# Patient Record
Sex: FEMALE | Race: BLACK OR AFRICAN AMERICAN | Employment: OTHER | URBAN - METROPOLITAN AREA
[De-identification: names, ages, dates, MRNs, and addresses within clinical notes are randomized per-mention and may not be internally consistent; named-entity substitution may affect disease eponyms.]

---

## 2017-04-05 ENCOUNTER — HOSPITAL ENCOUNTER (OUTPATIENT)
Dept: LAB | Age: 77
Discharge: HOME | End: 2017-04-05

## 2017-04-05 PROCEDURE — 99001 SPECIMEN HANDLING PT-LAB: CPT | Performed by: INTERNAL MEDICINE

## 2017-09-20 ENCOUNTER — HOSPITAL ENCOUNTER (OUTPATIENT)
Dept: LAB | Age: 77
Discharge: HOME | End: 2017-09-20

## 2017-09-20 PROCEDURE — 99001 SPECIMEN HANDLING PT-LAB: CPT | Performed by: INTERNAL MEDICINE

## 2020-03-25 ENCOUNTER — HOSPITAL ENCOUNTER (INPATIENT)
Age: 80
LOS: 4 days | Discharge: NURSING, REHAB OR LONG-TERM CARE | DRG: 064 | End: 2020-03-29
Attending: EMERGENCY MEDICINE | Admitting: HOSPITALIST
Payer: MEDICARE

## 2020-03-25 ENCOUNTER — APPOINTMENT (OUTPATIENT)
Dept: VASCULAR SURGERY | Age: 80
DRG: 064 | End: 2020-03-25
Attending: HOSPITALIST
Payer: MEDICARE

## 2020-03-25 ENCOUNTER — APPOINTMENT (OUTPATIENT)
Dept: GENERAL RADIOLOGY | Age: 80
DRG: 064 | End: 2020-03-25
Attending: EMERGENCY MEDICINE
Payer: MEDICARE

## 2020-03-25 ENCOUNTER — APPOINTMENT (OUTPATIENT)
Dept: MRI IMAGING | Age: 80
DRG: 064 | End: 2020-03-25
Attending: HOSPITALIST
Payer: MEDICARE

## 2020-03-25 ENCOUNTER — APPOINTMENT (OUTPATIENT)
Dept: CT IMAGING | Age: 80
DRG: 064 | End: 2020-03-25
Attending: EMERGENCY MEDICINE
Payer: MEDICARE

## 2020-03-25 ENCOUNTER — APPOINTMENT (OUTPATIENT)
Dept: ULTRASOUND IMAGING | Age: 80
DRG: 064 | End: 2020-03-25
Attending: HOSPITALIST
Payer: MEDICARE

## 2020-03-25 ENCOUNTER — APPOINTMENT (OUTPATIENT)
Dept: NON INVASIVE DIAGNOSTICS | Age: 80
DRG: 064 | End: 2020-03-25
Attending: HOSPITALIST
Payer: MEDICARE

## 2020-03-25 DIAGNOSIS — R41.0 DISORIENTATION: ICD-10-CM

## 2020-03-25 DIAGNOSIS — E78.2 MIXED HYPERLIPIDEMIA: ICD-10-CM

## 2020-03-25 DIAGNOSIS — I63.311 CEREBROVASCULAR ACCIDENT (CVA) DUE TO THROMBOSIS OF RIGHT MIDDLE CEREBRAL ARTERY (HCC): ICD-10-CM

## 2020-03-25 DIAGNOSIS — R47.1 DYSARTHRIA: Primary | ICD-10-CM

## 2020-03-25 DIAGNOSIS — I10 ESSENTIAL HYPERTENSION: ICD-10-CM

## 2020-03-25 DIAGNOSIS — R29.810 WEAKNESS, FACIAL: ICD-10-CM

## 2020-03-25 PROBLEM — I65.23 BILATERAL CAROTID ARTERY STENOSIS: Status: ACTIVE | Noted: 2020-03-25

## 2020-03-25 PROBLEM — R47.01 APHASIA: Status: ACTIVE | Noted: 2020-03-25

## 2020-03-25 LAB
ALBUMIN SERPL-MCNC: 3.6 G/DL (ref 3.5–5)
ALBUMIN/GLOB SERPL: 0.9 {RATIO} (ref 1.1–2.2)
ALP SERPL-CCNC: 109 U/L (ref 45–117)
ALT SERPL-CCNC: 21 U/L (ref 12–78)
ANION GAP SERPL CALC-SCNC: 5 MMOL/L (ref 5–15)
AST SERPL-CCNC: 17 U/L (ref 15–37)
ATRIAL RATE: 84 BPM
AV PEAK GRADIENT: 39.19 MMHG
AV VELOCITY RATIO: 0.69
BASOPHILS # BLD: 0 K/UL (ref 0–0.1)
BASOPHILS NFR BLD: 0 % (ref 0–1)
BILIRUB SERPL-MCNC: 0.4 MG/DL (ref 0.2–1)
BUN SERPL-MCNC: 13 MG/DL (ref 6–20)
BUN/CREAT SERPL: 16 (ref 12–20)
CALCIUM SERPL-MCNC: 8.9 MG/DL (ref 8.5–10.1)
CALCULATED P AXIS, ECG09: 112 DEGREES
CALCULATED R AXIS, ECG10: -7 DEGREES
CALCULATED T AXIS, ECG11: 11 DEGREES
CHLORIDE SERPL-SCNC: 111 MMOL/L (ref 97–108)
CO2 SERPL-SCNC: 26 MMOL/L (ref 21–32)
COMMENT, HOLDF: NORMAL
CREAT SERPL-MCNC: 0.81 MG/DL (ref 0.55–1.02)
DIAGNOSIS, 93000: NORMAL
DIFFERENTIAL METHOD BLD: ABNORMAL
ECHO AO ROOT DIAM: 3.44 CM
ECHO AV AREA PEAK VELOCITY: 2 CM2
ECHO AV PEAK GRADIENT: 7.3 MMHG
ECHO AV PEAK VELOCITY: 135.16 CM/S
ECHO AV REGURGITANT PHT: 729.5 CM
ECHO EST RA PRESSURE: 10 MMHG
ECHO LA AREA 4C: 14.3 CM2
ECHO LA VOL 4C: 34.8 ML (ref 22–52)
ECHO LA VOLUME INDEX A4C: 20.2 ML/M2 (ref 16–28)
ECHO LV E' LATERAL VELOCITY: 5.89 CM/S
ECHO LV E' SEPTAL VELOCITY: 7.07 CM/S
ECHO LV EDV A4C: 67.8 ML
ECHO LV EDV INDEX A4C: 39.4 ML/M2
ECHO LV EJECTION FRACTION A4C: 49 %
ECHO LV ESV A4C: 34.8 ML
ECHO LV ESV INDEX A4C: 20.2 ML/M2
ECHO LV INTERNAL DIMENSION DIASTOLIC MMODE: 4.5 CM
ECHO LV INTERNAL DIMENSION SYSTOLIC MMODE: 2.8 CM
ECHO LV IVSD MMODE: 1.1 CM
ECHO LV IVSS MMODE: 2.02 CM
ECHO LV POSTERIOR WALL DIASTOLIC MMODE: 1.1 CM
ECHO LV POSTERIOR WALL SYSTOLIC MMODE: 1.7 CM
ECHO LVOT DIAM: 1.94 CM
ECHO LVOT PEAK GRADIENT: 3.5 MMHG
ECHO LVOT PEAK VELOCITY: 93.13 CM/S
ECHO MV A VELOCITY: 77.76 CM/S
ECHO MV E DECELERATION TIME (DT): 269 MS
ECHO MV E VELOCITY: 72.39 CM/S
ECHO MV E/A RATIO: 0.93
ECHO MV E/E' LATERAL: 12.29
ECHO MV E/E' RATIO (AVERAGED): 11.26
ECHO MV E/E' SEPTAL: 10.24
ECHO PULMONARY ARTERY SYSTOLIC PRESSURE (PASP): 40.5 MMHG
ECHO PV MAX VELOCITY: 68.08 CM/S
ECHO PV PEAK GRADIENT: 1.9 MMHG
ECHO RIGHT VENTRICULAR SYSTOLIC PRESSURE (RVSP): 40.5 MMHG
ECHO RV INTERNAL DIMENSION: 3.45 CM
ECHO TV A WAVE: 43.76 CM/S
ECHO TV E WAVE: 35.26 CM/S
ECHO TV EROA: 1.2
ECHO TV REGURGITANT MAX VELOCITY: 276.16 CM/S
ECHO TV REGURGITANT PEAK GRADIENT: 30.5 MMHG
EOSINOPHIL # BLD: 0 K/UL (ref 0–0.4)
EOSINOPHIL NFR BLD: 0 % (ref 0–7)
ERYTHROCYTE [DISTWIDTH] IN BLOOD BY AUTOMATED COUNT: 15.8 % (ref 11.5–14.5)
GLOBULIN SER CALC-MCNC: 4.1 G/DL (ref 2–4)
GLUCOSE BLD STRIP.AUTO-MCNC: 105 MG/DL (ref 65–100)
GLUCOSE BLD STRIP.AUTO-MCNC: 110 MG/DL (ref 65–100)
GLUCOSE BLD STRIP.AUTO-MCNC: 131 MG/DL (ref 65–100)
GLUCOSE BLD STRIP.AUTO-MCNC: 138 MG/DL (ref 65–100)
GLUCOSE SERPL-MCNC: 123 MG/DL (ref 65–100)
HCT VFR BLD AUTO: 43.2 % (ref 35–47)
HGB BLD-MCNC: 13.9 G/DL (ref 11.5–16)
IMM GRANULOCYTES # BLD AUTO: 0 K/UL (ref 0–0.04)
IMM GRANULOCYTES NFR BLD AUTO: 0 % (ref 0–0.5)
INR PPP: 1 (ref 0.9–1.1)
LEFT CCA DIST DIAS: 18.1 CM/S
LEFT CCA DIST SYS: 67.4 CM/S
LEFT CCA PROX DIAS: 16.3 CM/S
LEFT CCA PROX SYS: 67.4 CM/S
LEFT ECA DIAS: 19.9 CM/S
LEFT ECA SYS: 61.9 CM/S
LEFT ICA DIST DIAS: 10.8 CM/S
LEFT ICA DIST SYS: 43.7 CM/S
LEFT ICA MID DIAS: 10.8 CM/S
LEFT ICA MID SYS: 34.5 CM/S
LEFT ICA PROX DIAS: 12.6 CM/S
LEFT ICA PROX SYS: 54.6 CM/S
LEFT ICA/CCA SYS: 0.8
LEFT SUBCLAVIAN DIAS: 0 CM/S
LEFT SUBCLAVIAN SYS: 135 CM/S
LEFT VERTEBRAL DIAS: 10.8 CM/S
LEFT VERTEBRAL SYS: 52.8 CM/S
LVFS: 30.22 %
LYMPHOCYTES # BLD: 0.9 K/UL (ref 0.8–3.5)
LYMPHOCYTES NFR BLD: 14 % (ref 12–49)
MCH RBC QN AUTO: 30 PG (ref 26–34)
MCHC RBC AUTO-ENTMCNC: 32.2 G/DL (ref 30–36.5)
MCV RBC AUTO: 93.1 FL (ref 80–99)
MONOCYTES # BLD: 0.5 K/UL (ref 0–1)
MONOCYTES NFR BLD: 7 % (ref 5–13)
MV DEC SLOPE: 2.69
NEUTS SEG # BLD: 5.4 K/UL (ref 1.8–8)
NEUTS SEG NFR BLD: 79 % (ref 32–75)
NRBC # BLD: 0 K/UL (ref 0–0.01)
NRBC BLD-RTO: 0 PER 100 WBC
P-R INTERVAL, ECG05: 122 MS
PISA AR MAX VEL: 313.02 CM/S
PLATELET # BLD AUTO: 240 K/UL (ref 150–400)
PMV BLD AUTO: 10.5 FL (ref 8.9–12.9)
POTASSIUM SERPL-SCNC: 4.3 MMOL/L (ref 3.5–5.1)
PROT SERPL-MCNC: 7.7 G/DL (ref 6.4–8.2)
PROTHROMBIN TIME: 10.3 SEC (ref 9–11.1)
Q-T INTERVAL, ECG07: 400 MS
QRS DURATION, ECG06: 78 MS
QTC CALCULATION (BEZET), ECG08: 472 MS
RBC # BLD AUTO: 4.64 M/UL (ref 3.8–5.2)
RIGHT CCA DIST DIAS: 19.9 CM/S
RIGHT CCA DIST SYS: 63.7 CM/S
RIGHT CCA PROX DIAS: 19.9 CM/S
RIGHT CCA PROX SYS: 87.5 CM/S
RIGHT ECA DIAS: 23.6 CM/S
RIGHT ECA SYS: 91.1 CM/S
RIGHT ICA DIST DIAS: 16.1 CM/S
RIGHT ICA DIST SYS: 55.5 CM/S
RIGHT ICA MID DIAS: 19.9 CM/S
RIGHT ICA MID SYS: 72.9 CM/S
RIGHT ICA PROX DIAS: 16.3 CM/S
RIGHT ICA PROX SYS: 54.6 CM/S
RIGHT ICA/CCA SYS: 1.1
RIGHT SUBCLAVIAN DIAS: 19.9 CM/S
RIGHT SUBCLAVIAN SYS: 93 CM/S
RIGHT VERTEBRAL DIAS: 0 CM/S
RIGHT VERTEBRAL SYS: 32.8 CM/S
SAMPLES BEING HELD,HOLD: NORMAL
SERVICE CMNT-IMP: ABNORMAL
SODIUM SERPL-SCNC: 142 MMOL/L (ref 136–145)
TROPONIN I SERPL-MCNC: <0.05 NG/ML
VENTRICULAR RATE, ECG03: 84 BPM
WBC # BLD AUTO: 6.9 K/UL (ref 3.6–11)

## 2020-03-25 PROCEDURE — 97162 PT EVAL MOD COMPLEX 30 MIN: CPT | Performed by: PHYSICAL THERAPIST

## 2020-03-25 PROCEDURE — 70450 CT HEAD/BRAIN W/O DYE: CPT

## 2020-03-25 PROCEDURE — 36415 COLL VENOUS BLD VENIPUNCTURE: CPT

## 2020-03-25 PROCEDURE — 70498 CT ANGIOGRAPHY NECK: CPT

## 2020-03-25 PROCEDURE — 65660000000 HC RM CCU STEPDOWN

## 2020-03-25 PROCEDURE — 93880 EXTRACRANIAL BILAT STUDY: CPT

## 2020-03-25 PROCEDURE — 99285 EMERGENCY DEPT VISIT HI MDM: CPT

## 2020-03-25 PROCEDURE — 0042T CT CODE NEURO PERF W CBF: CPT

## 2020-03-25 PROCEDURE — 74011250637 HC RX REV CODE- 250/637: Performed by: HOSPITALIST

## 2020-03-25 PROCEDURE — 80053 COMPREHEN METABOLIC PANEL: CPT

## 2020-03-25 PROCEDURE — 93005 ELECTROCARDIOGRAM TRACING: CPT

## 2020-03-25 PROCEDURE — 97116 GAIT TRAINING THERAPY: CPT | Performed by: PHYSICAL THERAPIST

## 2020-03-25 PROCEDURE — 71045 X-RAY EXAM CHEST 1 VIEW: CPT

## 2020-03-25 PROCEDURE — 85610 PROTHROMBIN TIME: CPT

## 2020-03-25 PROCEDURE — 97535 SELF CARE MNGMENT TRAINING: CPT | Performed by: OCCUPATIONAL THERAPIST

## 2020-03-25 PROCEDURE — 70551 MRI BRAIN STEM W/O DYE: CPT

## 2020-03-25 PROCEDURE — 84484 ASSAY OF TROPONIN QUANT: CPT

## 2020-03-25 PROCEDURE — 85025 COMPLETE CBC W/AUTO DIFF WBC: CPT

## 2020-03-25 PROCEDURE — 92610 EVALUATE SWALLOWING FUNCTION: CPT

## 2020-03-25 PROCEDURE — 93306 TTE W/DOPPLER COMPLETE: CPT

## 2020-03-25 PROCEDURE — 97166 OT EVAL MOD COMPLEX 45 MIN: CPT | Performed by: OCCUPATIONAL THERAPIST

## 2020-03-25 PROCEDURE — 92523 SPEECH SOUND LANG COMPREHEN: CPT

## 2020-03-25 PROCEDURE — 74011250636 HC RX REV CODE- 250/636: Performed by: HOSPITALIST

## 2020-03-25 PROCEDURE — 74011636320 HC RX REV CODE- 636/320: Performed by: EMERGENCY MEDICINE

## 2020-03-25 PROCEDURE — 95816 EEG AWAKE AND DROWSY: CPT | Performed by: PSYCHIATRY & NEUROLOGY

## 2020-03-25 PROCEDURE — 76536 US EXAM OF HEAD AND NECK: CPT

## 2020-03-25 PROCEDURE — 82962 GLUCOSE BLOOD TEST: CPT

## 2020-03-25 RX ORDER — SODIUM CHLORIDE 0.9 % (FLUSH) 0.9 %
5-40 SYRINGE (ML) INJECTION EVERY 8 HOURS
Status: DISCONTINUED | OUTPATIENT
Start: 2020-03-25 | End: 2020-03-29 | Stop reason: HOSPADM

## 2020-03-25 RX ORDER — INSULIN LISPRO 100 [IU]/ML
INJECTION, SOLUTION INTRAVENOUS; SUBCUTANEOUS
Status: DISCONTINUED | OUTPATIENT
Start: 2020-03-25 | End: 2020-03-29 | Stop reason: HOSPADM

## 2020-03-25 RX ORDER — LOSARTAN POTASSIUM 50 MG/1
100 TABLET ORAL DAILY
Status: DISCONTINUED | OUTPATIENT
Start: 2020-03-25 | End: 2020-03-29 | Stop reason: HOSPADM

## 2020-03-25 RX ORDER — DEXTROSE 50 % IN WATER (D50W) INTRAVENOUS SYRINGE
12.5-25 AS NEEDED
Status: DISCONTINUED | OUTPATIENT
Start: 2020-03-25 | End: 2020-03-29 | Stop reason: HOSPADM

## 2020-03-25 RX ORDER — ASPIRIN 300 MG/1
300 SUPPOSITORY RECTAL
Status: COMPLETED | OUTPATIENT
Start: 2020-03-25 | End: 2020-03-25

## 2020-03-25 RX ORDER — SODIUM CHLORIDE 0.9 % (FLUSH) 0.9 %
5-40 SYRINGE (ML) INJECTION AS NEEDED
Status: DISCONTINUED | OUTPATIENT
Start: 2020-03-25 | End: 2020-03-29 | Stop reason: HOSPADM

## 2020-03-25 RX ORDER — ATORVASTATIN CALCIUM 40 MG/1
40 TABLET, FILM COATED ORAL
Status: DISCONTINUED | OUTPATIENT
Start: 2020-03-25 | End: 2020-03-29 | Stop reason: HOSPADM

## 2020-03-25 RX ORDER — MAGNESIUM SULFATE 100 %
4 CRYSTALS MISCELLANEOUS AS NEEDED
Status: DISCONTINUED | OUTPATIENT
Start: 2020-03-25 | End: 2020-03-29 | Stop reason: HOSPADM

## 2020-03-25 RX ORDER — DILTIAZEM HYDROCHLORIDE 120 MG/1
240 CAPSULE, COATED, EXTENDED RELEASE ORAL DAILY
Status: DISCONTINUED | OUTPATIENT
Start: 2020-03-25 | End: 2020-03-27

## 2020-03-25 RX ORDER — METOPROLOL TARTRATE 50 MG/1
50 TABLET ORAL 2 TIMES DAILY
Status: DISCONTINUED | OUTPATIENT
Start: 2020-03-25 | End: 2020-03-29 | Stop reason: HOSPADM

## 2020-03-25 RX ORDER — GUAIFENESIN 100 MG/5ML
81 LIQUID (ML) ORAL DAILY
Status: CANCELLED | OUTPATIENT
Start: 2020-03-26

## 2020-03-25 RX ORDER — ACETAMINOPHEN 325 MG/1
650 TABLET ORAL
Status: DISCONTINUED | OUTPATIENT
Start: 2020-03-25 | End: 2020-03-29 | Stop reason: HOSPADM

## 2020-03-25 RX ORDER — ASPIRIN 300 MG/1
300 SUPPOSITORY RECTAL DAILY
Status: DISCONTINUED | OUTPATIENT
Start: 2020-03-26 | End: 2020-03-26

## 2020-03-25 RX ORDER — ACETAMINOPHEN 650 MG/1
650 SUPPOSITORY RECTAL
Status: DISCONTINUED | OUTPATIENT
Start: 2020-03-25 | End: 2020-03-29 | Stop reason: HOSPADM

## 2020-03-25 RX ORDER — ONDANSETRON 2 MG/ML
4 INJECTION INTRAMUSCULAR; INTRAVENOUS
Status: DISCONTINUED | OUTPATIENT
Start: 2020-03-25 | End: 2020-03-29 | Stop reason: HOSPADM

## 2020-03-25 RX ORDER — SODIUM CHLORIDE 0.9 % (FLUSH) 0.9 %
10 SYRINGE (ML) INJECTION
Status: COMPLETED | OUTPATIENT
Start: 2020-03-25 | End: 2020-03-25

## 2020-03-25 RX ORDER — ENOXAPARIN SODIUM 100 MG/ML
40 INJECTION SUBCUTANEOUS EVERY 24 HOURS
Status: DISCONTINUED | OUTPATIENT
Start: 2020-03-25 | End: 2020-03-29 | Stop reason: HOSPADM

## 2020-03-25 RX ORDER — ASPIRIN 325 MG
325 TABLET ORAL ONCE
Status: DISCONTINUED | OUTPATIENT
Start: 2020-03-25 | End: 2020-03-25

## 2020-03-25 RX ADMIN — Medication 10 ML: at 07:12

## 2020-03-25 RX ADMIN — Medication 10 ML: at 08:56

## 2020-03-25 RX ADMIN — IOPAMIDOL 120 ML: 755 INJECTION, SOLUTION INTRAVENOUS at 07:12

## 2020-03-25 RX ADMIN — ASPIRIN 300 MG: 300 SUPPOSITORY RECTAL at 08:55

## 2020-03-25 RX ADMIN — METOPROLOL TARTRATE 50 MG: 50 TABLET, FILM COATED ORAL at 17:57

## 2020-03-25 RX ADMIN — Medication 10 ML: at 21:14

## 2020-03-25 RX ADMIN — Medication 10 ML: at 13:25

## 2020-03-25 RX ADMIN — ATORVASTATIN CALCIUM 40 MG: 40 TABLET, FILM COATED ORAL at 21:14

## 2020-03-25 RX ADMIN — ENOXAPARIN SODIUM 40 MG: 40 INJECTION SUBCUTANEOUS at 17:56

## 2020-03-25 NOTE — PROGRESS NOTES
Reason for Admission: Aphasia                      RUR Score: 9% - low risk                    Plan for utilizing home health: PT/OT/SLP consulted. CM will continue to follow to determine if pt has any post-acute therapy needs at d/c         PCP: First and Last name:  Pt's daughter (Madyson Vargas: [phone]) reported PCP listed, Dr. Nataly Cho is no longer pt's doctor. Pt's daughter reported pt has a new PCP but is not able to recall name. Pt's daughter reported she is going to look in her files to identify current PCP; pt's daughter will contact CM once she is able to secure updated information. Name of Practice: Pt's daughter unable to recall name of Dr. Javier Coon practice   Are you a current patient: Yes/No: No   Approximate date of last visit: Pt's daughter unable to report pt's last visit with Dr. Sebastien Travis. Pt's daughter reported pt has a new PCP provider who she last saw 3/23/2020. Current Advanced Directive/Advance Care Plan: Pt is listed as a full code status with no ACP on file. CM inquired if pt's daughter is aware of whether or not pt has had ACP completed; pt's daughter confirmed reporting \"we already did that with a . \"                          Transition of Care Plan:                      * TBD - anticipate home with HH vs SNF    > Pt will need PCP f/u appt secured prior to d/c  > 2nd IM & FOC prior to d/c    CM contacted pt's daughter to check in, introduce role, and complete initial assessment. Pt's daughter verified demographic information and confirmed she lives with pt and remains accessible if needs arise. Pt's daughter reported pt is independent with ADL's at baseline, uses no DME in the home, and has no barriers related to accessing/paying for medication. Pt's preferred pharmacy is the [facility] on [address]. Pt's daughter reported pt has no prior hx of utilizing HH, SNF, or in-pt rehab intervention.     CM will continue to follow patient for discharge planning needs and arrange for services as deemed necessary. Care Management Interventions  PCP Verified by CM: No  Mode of Transport at Discharge:  Other (see comment)(Pt's daughter will provide transportation at d/c)  Transition of Care Consult (CM Consult): Discharge Planning  Discharge Durable Medical Equipment: No  Physical Therapy Consult: Yes  Occupational Therapy Consult: Yes  Speech Therapy Consult: Yes  Current Support Network: Own Home, Other(Pt lives with her daughter in single story home with 3 stairs leading to the entrance)  Confirm Follow Up Transport: Family  Discharge Location  Discharge Placement: Home with home health    Nalini Horowitz, [address] [place] [address], [address] [facility]

## 2020-03-25 NOTE — PROGRESS NOTES
TRANSFER - IN REPORT:    Verbal report received from Osito Bright (name) on [facility]  being received from ED (unit) for routine progression of care      Report consisted of patients Situation, Background, Assessment and   Recommendations(SBAR). Information from the following report(s) SBAR, Kardex, ED Summary, Intake/Output, MAR and Recent Results was reviewed with the receiving nurse. Opportunity for questions and clarification was provided.

## 2020-03-25 NOTE — CONSULTS
IP CONSULT TO NEUROLOGY  Consult performed by: Jackie Fonseca MD  Consult ordered by: Abrahan Granger MD            NEUROLOGY CONSULT    NAME Shraddha Walker AGE 70 y.o. MRN [id number]  1940     REQUESTING PHYSICIAN: Abrahan Granger MD      CHIEF COMPLAINT: Left-sided weakness     This is a 70 y.o. female with medical history of stroke, hypertension and diabetes. The patient is admitted for slurred speech and left facial droop. Symptoms started the night of the . Symptoms have persisted. ASSESSMENT AND PLAN     1. Stroke  MRI shows extensive white matter disease and small acute infarction of the posterior right corona radiata  CT of the head and neck reveals no flow-limiting stenosis or intracranial aneurysm  Of mention her thyroid hypodensities with recommendation for non-urgent outpatient thyroid ultrasound  Echocardiogram shows left EF of 55 to 60%  LDL hemoglobin A1c are pending  Permissive hypertension for the first 24 hours  Start Plavix 75 mg if she passes a swallow eval  Continue Lipitor  Discussed risk factors for stroke and treatment    2. Severe dysarthria  Speech therapy    3. Hypertension  Continue Cozaar    4. Hyperlipidemia  Continue atorvastatin    5. Altered mental status  Obtain EEG      ALLERGIES:  Patient has no known allergies.      Current Facility-Administered Medications   Medication Dose Route Frequency    acetaminophen (TYLENOL) tablet 650 mg  650 mg Oral Q6H PRN    ondansetron (ZOFRAN) injection 4 mg  4 mg IntraVENous Q4H PRN    sodium chloride (NS) flush 5-40 mL  5-40 mL IntraVENous Q8H    sodium chloride (NS) flush 5-40 mL  5-40 mL IntraVENous PRN    enoxaparin (LOVENOX) injection 40 mg  40 mg SubCUTAneous Q24H    metoprolol tartrate (LOPRESSOR) tablet 50 mg  50 mg Oral BID    losartan (COZAAR) tablet 100 mg  100 mg Oral DAILY    atorvastatin (LIPITOR) tablet 40 mg  40 mg Oral QHS    dilTIAZem CD (CARDIZEM CD) capsule 240 mg  240 mg Oral DAILY    insulin lispro (HUMALOG) injection   SubCUTAneous TIDAC    glucose chewable tablet 16 g  4 Tab Oral PRN    dextrose (D50W) injection syrg 12.5-25 g  12.5-25 g IntraVENous PRN    glucagon (GLUCAGEN) injection 1 mg  1 mg IntraMUSCular PRN    [START ON 3/26/2020] aspirin (ASA) suppository 300 mg  300 mg Rectal DAILY    influenza vaccine 2019-20 (6 mos+)(PF) (FLUARIX/FLULAVAL/FLUZONE QUAD) injection 0.5 mL  0.5 mL IntraMUSCular PRIOR TO DISCHARGE    acetaminophen (TYLENOL) tablet 650 mg  650 mg Oral Q4H PRN    Or    acetaminophen (TYLENOL) solution 650 mg  650 mg Per NG tube Q4H PRN    Or    acetaminophen (TYLENOL) suppository 650 mg  650 mg Rectal Q4H PRN       Past Medical History:   Diagnosis Date    Diabetes ([facility] [address]     Hypertension        Social History     Tobacco Use    Smoking status: Passive Smoke Exposure - Never Smoker   Substance Use Topics    Alcohol use: No     Family history of  Sarcoidosis - niece  Dementia sister    Visit Vitals  /86 (BP 1 Location: Right arm, BP Patient Position: Sitting)   Pulse 94   Temp 98.3 °F (36.8 °C)   Resp 20   Ht 5' 2\" (1.575 m)   Wt 156 lb 8.4 oz (71 kg)   SpO2 99%   Breastfeeding No   BMI 28.63 kg/m²      General: Well developed, well nourished. Patient in no apparent distress   Head: Normocephalic, atraumatic, anicteric sclera   Neck Normal ROM, No thyromegally   Lungs:  Clear to auscultation bilaterally, No wheezes or rubs   Cardiac: Regular rate and rhythm with no murmurs. Abd: Bowel sounds were audible. No tenderness on palpation   Ext: No pedal edema   Skin: Supple no rash     NeurologicExam:  Mental Status:  Oriented to person place but not time appears confused, answers inconsistently   Speech:  Severe dysarthria   Cranial Nerves:  II - XII Intact subtle left facial droop   Motor:   Subtle left upper and lower extremity weakness   Reflexes:   Deep tendon reflexes 1+/4 and symmetric.    Sensory:    Sensory loss on the left   Gait:  Gait is balanced and fluid with normal arm swing. Tremor:   No tremor noted. Cerebellar:  Coordination intact. Neurovascular: No carotid bruits. No JVD           REVIEWED IMAGING:    MRI :  Results from Hospital Encounter encounter on 03/25/20   MRI BRAIN WO CONT    Narrative INDICATION:   stroke    EXAMINATION:  MRI BRAIN WO CONTRAST    COMPARISON:  October 4, 2009 MRI and CTA March 25, 2020    TECHNIQUE:  Multiplanar multisequence acquisition without contrast of the brain. FINDINGS:      Ventricles:  Midline, no hydrocephalus. Brain Parenchyma/Brainstem:  Extensive chronic white matter disease throughout  the supratentorial brain and to a lesser degree in the cesilia. Several areas of  chronic infarction involving the bilateral corona radiata, corpus callosum,  right thalamus. Small area of acute infarction posterior right corona radiata. Intracranial Hemorrhage:  None. Basal Cisterns:  Normal.   Flow Voids:  Normal.  Additional Comments:  N/A. Impression IMPRESSION:  Small acute infarction posterior right corona radiata. Areas of chronic  infarction and confluent white matter disease as above. CT:  Results from Hospital Encounter encounter on 03/25/20   CT CODE NEURO PERF W CBF    Narrative Clinical history: CODE S  INDICATION:   CODE S slurred speech, left-sided facial droop    COMPARISON:  3/25/2020  TECHNIQUE:    CT dose reduction was achieved through use of a standardized protocol tailored  for this examination and automatic exposure control for dose modulation. Following the uneventful administration of Isovue-370 contrast material, axial  CT angiography of the head and neck was performed. Coronal and sagittal  reconstructions were obtained. 3D MAXIMAL INTENSITY PROJECTION reconstructed imaging of the cranial vasculature  in both the coronal and sagittal plane was performed.    . Reconstructions were created with color coding of axial time to peak, axial  blood volume, axial blood flow, axial mean transit time and axial T Max. FINDINGS:  There is centrilobular emphysematous change. Thyroid nodules on the right and on  the left. Aortic atherosclerotic change is mild. The left vertebral artery is  dominant with a hypoplastic right vertebral artery. Multilevel canal and  foraminal stenosis of the cervical spine. Periventricular and extensive scattered hypodensities in the cerebral white  matter consistent with severe chronic microvascular ischemic change. .  Basilar  ganglia calcifications. . . The basal cisterns are clear. .    CTA NECK:   . The origins of the innominate, left common carotid and left subclavian  arteries are normal.    There is  0%stenosis in the right internal carotid artery utilizing NASCET  criteria. There is  0%stenosis in the right internal carotid artery utilizing NASCET  criteria. CTA HEAD:  Mild atherosclerotic change of the cavernous carotid arteries. The left  vertebral artery is dominant. Posterior communicating artery on the right. There  are A1 segments bilaterally. M1 segments are patent on the right and on the  left. M2 segments are patent. P1 segments are patent. Proximal P2 segments are  patent as well. . Moderate to severe distal P2 segment stenosis on the left. Mild  A2 segment stenosis distally on the right. . The internal carotid, anterior  cerebral, and middle cerebral arteries are patent. Hypoplastic right vertebral  artery. . There is no aneurysm. .    CT PERFUSION: No evidence of perfusion mismatch. No evidence of reversible  ischemia. Impression IMPRESSION:  No flow limiting stenosis or intracranial aneurysm. No major vessel occlusion. Severe chronic microvascular ischemic change with small/moderate remote  infarction in the left frontal lobe. Thyroid hypodensities. Nonemergent outpatient thyroid ultrasound when clinically  feasible is suggested.          REVIEWED LABS:  Lab Results   Component Value Date/Time    WBC 6.9 03/25/2020 07:20 AM    HCT 43.2 03/25/2020 07:20 AM    HGB 13.9 03/25/2020 07:20 AM    PLATELET 333 46/13/4187 07:20 AM     Lab Results   Component Value Date/Time    Sodium 142 03/25/2020 07:20 AM    Potassium 4.3 03/25/2020 07:20 AM    Chloride 111 (H) 03/25/2020 07:20 AM    CO2 26 03/25/2020 07:20 AM    Glucose 123 (H) 03/25/2020 07:20 AM    BUN 13 03/25/2020 07:20 AM    Creatinine 0.81 03/25/2020 07:20 AM    Calcium 8.9 03/25/2020 07:20 AM       Lab Results   Component Value Date/Time    LDL, calculated 77.4 10/04/2009 02:15 AM     Lab Results   Component Value Date/Time    Hemoglobin A1c 5.4 10/03/2009 06:11 PM

## 2020-03-25 NOTE — PROGRESS NOTES
HP dictated    Admitted for L sided facial droop/ slurred speech    Speech/ Neurology consulted. MRI ordered. Rectal ASA ordered start Po meds when cleared by Speech.

## 2020-03-25 NOTE — ED PROVIDER NOTES
EMERGENCY DEPARTMENT HISTORY AND PHYSICAL EXAM      Date: 3/25/2020  Patient Name: Nora Arthur    History of Presenting Illness     Chief Complaint   Patient presents with    Dysarthria       History Provided By: Patient and EMS    HPI: Nora Arthur, [address] [facility] y.o. female  presents to the ED with cc of slurred speech and left facial droop. Patient last known well was 8 PM last night when she went to bed. She woke up this morning with symptoms noted above. EMS was notified and transported the patient to the emergency department. They did not notice any weakness in the extremities such as arm drift. The patient denies any numbness anywhere. She has a history of hypertension and diabetes. Blood sugar by EMS was 118. She is not on any antiplatelets or anticoagulants. There are no other complaints, changes, or physical findings at this time. PCP: Grisel Montenegro MD    No current facility-administered medications on file prior to encounter. Current Outpatient Medications on File Prior to Encounter   Medication Sig Dispense Refill    pravastatin (PRAVACHOL) 20 mg tablet Take 20 mg by mouth nightly.  diltiazem CD (CARTIA XT) 240 mg ER capsule Take 240 mg by mouth daily.  metFORMIN (GLUCOPHAGE) 500 mg tablet Take 500 mg by mouth two (2) times daily (with meals).  losartan (COZAAR) 100 mg tablet Take 100 mg by mouth daily.  metoprolol tartrate (LOPRESSOR) 50 mg tablet Take 50 mg by mouth two (2) times a day.  ergocalciferol (VITAMIN D2) 50,000 unit capsule Take 50,000 Units by mouth every month.  diazepam (VALIUM) 2 mg tablet Take 1 Tab by mouth every eight (8) hours as needed (muscle spasm). Max Daily Amount: 6 mg. 20 Tab 0       Past History     Past Medical History:  Past Medical History:   Diagnosis Date    Diabetes ([facility] [address]     Hypertension        Past Surgical History:  No past surgical history on file. Family History:  No family history on file.     Social History:  Social History     Tobacco Use    Smoking status: Passive Smoke Exposure - Never Smoker   Substance Use Topics    Alcohol use: No    Drug use: No       Allergies:  No Known Allergies      Review of Systems   Review of Systems   Constitutional: Negative for chills and fever. HENT: Negative for congestion, ear pain, rhinorrhea, sore throat and trouble swallowing. Eyes: Negative for visual disturbance. Respiratory: Negative for cough, chest tightness and shortness of breath. Cardiovascular: Negative for chest pain and palpitations. Gastrointestinal: Negative for abdominal pain, blood in stool, constipation, diarrhea, nausea and vomiting. Genitourinary: Negative for decreased urine volume, difficulty urinating, dysuria and frequency. Musculoskeletal: Negative for back pain and neck pain. Skin: Negative for color change and rash. Neurological: Positive for facial asymmetry and speech difficulty. Negative for dizziness, weakness, light-headedness, numbness and headaches. Physical Exam   Physical Exam  Vitals signs and nursing note reviewed. Constitutional:       General: She is not in acute distress. Appearance: She is well-developed. She is not ill-appearing. Eyes:      Conjunctiva/sclera: Conjunctivae normal.   Neck:      Musculoskeletal: Neck supple. Cardiovascular:      Rate and Rhythm: Normal rate and regular rhythm. Pulmonary:      Effort: Pulmonary effort is normal. No accessory muscle usage or respiratory distress. Breath sounds: Normal breath sounds. Abdominal:      General: There is no distension. Palpations: Abdomen is soft. Tenderness: There is no abdominal tenderness. Lymphadenopathy:      Cervical: No cervical adenopathy. Skin:     General: Skin is warm and dry. Neurological:      Mental Status: She is alert and oriented to person, place, and time. Cranial Nerves: Dysarthria and facial asymmetry (mild left facial droop) present. No cranial nerve deficit. Sensory: No sensory deficit. Diagnostic Study Results     Labs -     Recent Results (from the past 24 hour(s))   CBC WITH AUTOMATED DIFF    Collection Time: 03/25/20  7:20 AM   Result Value Ref Range    WBC 6.9 3.6 - 11.0 K/uL    RBC 4.64 3.80 - 5.20 M/uL    HGB 13.9 11.5 - 16.0 g/dL    HCT 43.2 35.0 - 47.0 %    MCV 93.1 80.0 - 99.0 FL    MCH 30.0 26.0 - 34.0 PG    MCHC 32.2 30.0 - 36.5 g/dL    RDW 15.8 (H) 11.5 - 14.5 %    PLATELET 617 183 - 843 K/uL    MPV 10.5 8.9 - 12.9 FL    NRBC 0.0 0  WBC    ABSOLUTE NRBC 0.00 0.00 - 0.01 K/uL    NEUTROPHILS 79 (H) 32 - 75 %    LYMPHOCYTES 14 12 - 49 %    MONOCYTES 7 5 - 13 %    EOSINOPHILS 0 0 - 7 %    BASOPHILS 0 0 - 1 %    IMMATURE GRANULOCYTES 0 0.0 - 0.5 %    ABS. NEUTROPHILS 5.4 1.8 - 8.0 K/UL    ABS. LYMPHOCYTES 0.9 0.8 - 3.5 K/UL    ABS. MONOCYTES 0.5 0.0 - 1.0 K/UL    ABS. EOSINOPHILS 0.0 0.0 - 0.4 K/UL    ABS. BASOPHILS 0.0 0.0 - 0.1 K/UL    ABS. IMM. GRANS. 0.0 0.00 - 0.04 K/UL    DF AUTOMATED     METABOLIC PANEL, COMPREHENSIVE    Collection Time: 03/25/20  7:20 AM   Result Value Ref Range    Sodium 142 136 - 145 mmol/L    Potassium 4.3 3.5 - 5.1 mmol/L    Chloride 111 (H) 97 - 108 mmol/L    CO2 26 21 - 32 mmol/L    Anion gap 5 5 - 15 mmol/L    Glucose 123 (H) 65 - 100 mg/dL    BUN 13 6 - 20 MG/DL    Creatinine 0.81 0.55 - 1.02 MG/DL    BUN/Creatinine ratio 16 12 - 20      GFR est AA >60 >60 ml/min/1.73m2    GFR est non-AA >60 >60 ml/min/1.73m2    Calcium 8.9 8.5 - 10.1 MG/DL    Bilirubin, total 0.4 0.2 - 1.0 MG/DL    ALT (SGPT) 21 12 - 78 U/L    AST (SGOT) 17 15 - 37 U/L    Alk.  phosphatase 109 45 - 117 U/L    Protein, total 7.7 6.4 - 8.2 g/dL    Albumin 3.6 3.5 - 5.0 g/dL    Globulin 4.1 (H) 2.0 - 4.0 g/dL    A-G Ratio 0.9 (L) 1.1 - 2.2     PROTHROMBIN TIME + INR    Collection Time: 03/25/20  7:20 AM   Result Value Ref Range    INR 1.0 0.9 - 1.1      Prothrombin time 10.3 9.0 - 11.1 sec   TROPONIN I    Collection Time: 03/25/20  7:20 AM   Result Value Ref Range    Troponin-I, Qt. <0.05 <0.05 ng/mL   SAMPLES BEING HELD    Collection Time: 03/25/20  7:20 AM   Result Value Ref Range    SAMPLES BEING HELD  1 RED     COMMENT        Add-on orders for these samples will be processed based on acceptable specimen integrity and analyte stability, which may vary by analyte. GLUCOSE, POC    Collection Time: 03/25/20  7:33 AM   Result Value Ref Range    Glucose (POC) 110 (H) 65 - 100 mg/dL    Performed by Salena Carrel RN        Radiologic Studies -   CTA CODE NEURO HEAD AND NECK W CONT   Final Result   IMPRESSION:   No flow limiting stenosis or intracranial aneurysm. No major vessel occlusion. Severe chronic microvascular ischemic change with small/moderate remote   infarction in the left frontal lobe. Thyroid hypodensities. Nonemergent outpatient thyroid ultrasound when clinically   feasible is suggested. CT CODE NEURO PERF W CBF   Final Result   IMPRESSION:   No flow limiting stenosis or intracranial aneurysm. No major vessel occlusion. Severe chronic microvascular ischemic change with small/moderate remote   infarction in the left frontal lobe. Thyroid hypodensities. Nonemergent outpatient thyroid ultrasound when clinically   feasible is suggested. CT CODE NEURO HEAD WO CONTRAST   Final Result   IMPRESSION:    No definite acute process identified. Subtle asymmetry of density in the left M2 segment. CTA is pending. Imaging findings consistent with severe chronic microvascular ischemic change. There is a mild degree of cerebral atrophy. XR CHEST PORT    (Results Pending)     CT Results  (Last 48 hours)               03/25/20 0728  CTA CODE NEURO HEAD AND NECK W CONT Final result    Impression:  IMPRESSION:   No flow limiting stenosis or intracranial aneurysm. No major vessel occlusion.    Severe chronic microvascular ischemic change with small/moderate remote   infarction in the left frontal lobe. Thyroid hypodensities. Nonemergent outpatient thyroid ultrasound when clinically   feasible is suggested. Narrative:  Clinical history: Code Stroke   INDICATION:   Code Stroke       COMPARISON:  3/25/2020   TECHNIQUE:     CT dose reduction was achieved through use of a standardized protocol tailored   for this examination and automatic exposure control for dose modulation. Following the uneventful administration of Isovue-370 contrast material, axial   CT angiography of the head and neck was performed. Coronal and sagittal   reconstructions were obtained. 3D MAXIMAL INTENSITY PROJECTION reconstructed imaging of the cranial vasculature   in both the coronal and sagittal plane was performed. . Reconstructions were created with color coding of axial time to peak, axial   blood volume, axial blood flow, axial mean transit time and axial T Max. FINDINGS:   There is centrilobular emphysematous change. Thyroid nodules on the right and on   the left. Aortic atherosclerotic change is mild. The left vertebral artery is   dominant with a hypoplastic right vertebral artery. Multilevel canal and   foraminal stenosis of the cervical spine. Periventricular and extensive scattered hypodensities in the cerebral white   matter consistent with severe chronic microvascular ischemic change. .  Basilar   ganglia calcifications. . The gray white differentiation is maintained. The   basal cisterns are clear. The paranasal sinuses are clear. CTA NECK:    There is conventional three vessel arch anatomy. The origins of the innominate,   left common carotid and left subclavian arteries are normal.     There is  0%stenosis in the right internal carotid artery utilizing NASCET   criteria. There is  0%stenosis in the right internal carotid artery utilizing NASCET   criteria. CTA HEAD:   Mild atherosclerotic change of the cavernous carotid arteries.  The left   vertebral artery is dominant. Posterior communicating artery on the right. There   are A1 segments bilaterally. M1 segments are patent on the right and on the   left. M2 segments are patent. P1 segments are patent. Proximal P2 segments are   patent as well. . Moderate to severe distal P2 segment stenosis on the left. Mild   A2 segment stenosis distally on the right. . The internal carotid, anterior   cerebral, and middle cerebral arteries are patent. Hypoplastic right vertebral   artery. . There is no aneurysm. There are no sizable posterior communicating   arteries. CT PERFUSION: No evidence of perfusion mismatch. No evidence of reversible   ischemia.           03/25/20 0728  CT CODE NEURO PERF W CBF Final result    Impression:  IMPRESSION:   No flow limiting stenosis or intracranial aneurysm. No major vessel occlusion. Severe chronic microvascular ischemic change with small/moderate remote   infarction in the left frontal lobe. Thyroid hypodensities. Nonemergent outpatient thyroid ultrasound when clinically   feasible is suggested. Narrative:  Clinical history: CODE S   INDICATION:   CODE S slurred speech, left-sided facial droop       COMPARISON:  3/25/2020   TECHNIQUE:     CT dose reduction was achieved through use of a standardized protocol tailored   for this examination and automatic exposure control for dose modulation. Following the uneventful administration of Isovue-370 contrast material, axial   CT angiography of the head and neck was performed. Coronal and sagittal   reconstructions were obtained. 3D MAXIMAL INTENSITY PROJECTION reconstructed imaging of the cranial vasculature   in both the coronal and sagittal plane was performed. . Reconstructions were created with color coding of axial time to peak, axial   blood volume, axial blood flow, axial mean transit time and axial T Max. FINDINGS:   There is centrilobular emphysematous change. Thyroid nodules on the right and on   the left. Aortic atherosclerotic change is mild. The left vertebral artery is   dominant with a hypoplastic right vertebral artery. Multilevel canal and   foraminal stenosis of the cervical spine. Periventricular and extensive scattered hypodensities in the cerebral white   matter consistent with severe chronic microvascular ischemic change. .  Basilar   ganglia calcifications. . . The basal cisterns are clear. .       CTA NECK:    . The origins of the innominate, left common carotid and left subclavian   arteries are normal.     There is  0%stenosis in the right internal carotid artery utilizing NASCET   criteria. There is  0%stenosis in the right internal carotid artery utilizing NASCET   criteria. CTA HEAD:   Mild atherosclerotic change of the cavernous carotid arteries. The left   vertebral artery is dominant. Posterior communicating artery on the right. There   are A1 segments bilaterally. M1 segments are patent on the right and on the   left. M2 segments are patent. P1 segments are patent. Proximal P2 segments are   patent as well. . Moderate to severe distal P2 segment stenosis on the left. Mild   A2 segment stenosis distally on the right. . The internal carotid, anterior   cerebral, and middle cerebral arteries are patent. Hypoplastic right vertebral   artery. . There is no aneurysm. .       CT PERFUSION: No evidence of perfusion mismatch. No evidence of reversible   ischemia.           03/25/20 0716  CT CODE NEURO HEAD WO CONTRAST Final result    Impression:  IMPRESSION:    No definite acute process identified. Subtle asymmetry of density in the left M2 segment. CTA is pending. Imaging findings consistent with severe chronic microvascular ischemic change. There is a mild degree of cerebral atrophy.                Narrative:  CLINICAL HISTORY: Altered mental status, dizziness       INDICATION: Altered mental status, dizziness       COMPARISON: 10/13/2016   CT dose reduction was achieved through use of a standardized protocol tailored   for this examination and automatic exposure control for dose modulation. TECHNIQUE: Serial axial images with a collimation of 5 mm were obtained from the   skull base through the vertex     FINDINGS:    There is sulcal and ventricular prominence. Confluent periventricular and   scattered foci of hypodensity in the cerebral white matter. There is no evidence   of an acute infarction, hemorrhage, or mass-effect. There is no evidence of   midline shift or hydrocephalus. Posterior fossa structures are unremarkable. No   extra-axial collections are seen. Subtle asymmetric density left M2 posterior   division. Mastoid air cells are well pneumatized and clear. Infarction in the left frontal lobe. CXR Results  (Last 48 hours)    None            Medical Decision Making   I am the first provider for this patient. I reviewed the vital signs, available nursing notes, past medical history, past surgical history, family history and social history. Vital Signs-Reviewed the patient's vital signs. Patient Vitals for the past 24 hrs:   Temp Pulse Resp BP SpO2   03/25/20 0800 98.8 °F (37.1 °C) 85 18 134/42 100 %         Records Reviewed: Nursing Notes, Old Medical Records and Ambulance Run Sheet    Provider Notes (Medical Decision Making):   Patient presents to us with left facial droop and slurred speech. Patient was last known to be normal outside of the TPA window. She has no extremity weakness. CT and CTA imaging does not show any evidence of large vessel occlusion or cerebral hemorrhage. No metabolic abnormalities appear to be present. She does have a history of hypertension and diabetes which would place her at a high risk for stroke. No contraindications to antiplatelets. Will administer aspirin and recommend admission to the hospital for further evaluation. ED Course:   Initial assessment performed.  The patients presenting problems have been discussed, and they are in agreement with the care plan formulated and outlined with them. I have encouraged them to ask questions as they arise throughout their visit. Orders Placed This Encounter    CT CODE NEURO HEAD WO CONTRAST    CTA CODE NEURO HEAD AND NECK W CONT    CT CODE NEURO PERF W CBF    XR CHEST PORT    CBC WITH AUTOMATED DIFF    METABOLIC PANEL, COMPREHENSIVE    PROTHROMBIN TIME + INR    TROPONIN I    SAMPLES BEING HELD    DIET DIABETIC CONSISTENT CARB Regular    CARDIAC MONITOR - ED ONLY    PULSE OXIMETRY CONTINUOUS    VITAL SIGNS    INITIATE NURSING DYSPHAGIA SCREENING    MEASURE HEIGHT    WEIGH PATIENT    NEUROLOGIC STATUS ASSESSMENT    NIH STROKE SCALE ASSESSMENT    ELEVATE HEAD OF BED    OXYGEN CANNULA    POC GLUCOSE    NOTIFY PROVIDER: SPECIFY Notify provider on pt's arrival to floor ONE TIME STAT    OUT OF BED WITH ASSISTANCE    VITAL SIGNS PER UNIT ROUTINE    EKG NOTEWRITER(ASAP ONLY)    FULL CODE    GLUCOSE, POC    EKG, 12 LEAD, INITIAL    SAMPLE TO BLOOD BANK    SALINE LOCK IV ONE TIME STAT    sodium chloride (NS) flush 10 mL    iopamidoL (ISOVUE-370) 76 % injection 100 mL    aspirin tablet 325 mg    acetaminophen (TYLENOL) tablet 650 mg    ondansetron (ZOFRAN) injection 4 mg    IP CONSULT TO HOSPITALIST    INITIAL PHYSICIAN ORDER: INPATIENT Neuro/Stroke; 3.  Patient receiving treatment that can only be provided in an inpatient setting (further clarification in H&P documentation)       EKG  Date/Time: 3/25/2020 7:49 AM  Performed by: Claudia Lerner MD  Authorized by: Claudia Lerner MD     ECG reviewed by ED Physician in the absence of a cardiologist: yes    Rate:     ECG rate:  84    ECG rate assessment: normal    Rhythm:     Rhythm: sinus rhythm    Ectopy:     Ectopy: none    QRS:     QRS axis:  Normal    QRS intervals:  Normal  Conduction:     Conduction: normal    ST segments:     ST segments:  Non-specific  T waves:     T waves: non-specific            Critical Care Time:   0    Disposition:  Admit        Diagnosis     Clinical Impression:   1. Dysarthria    2. Weakness, facial            This note will not be viewable in MyChart.

## 2020-03-25 NOTE — ED NOTES
Patient's daughter Festus Caldera, contact info on facesheet) updated about admission and patient's location in hospital.

## 2020-03-25 NOTE — ED NOTES
TRANSFER - OUT REPORT:    Verbal report given to Glenny ACEVEDO(name) on Khadijah Kaye  being transferred to [facility] [phone] (unit) for routine progression of care       Report consisted of patients Situation, Background, Assessment and   Recommendations(SBAR). Information from the following report(s) SBAR, ED Summary, Recent Results and Cardiac Rhythm NSR was reviewed with the receiving nurse. Lines:   Peripheral IV 03/25/20 Left Hand (Active)   Site Assessment Clean, dry, & intact 3/25/2020  7:45 AM   Phlebitis Assessment 0 3/25/2020  7:45 AM   Infiltration Assessment 0 3/25/2020  7:45 AM   Dressing Status Clean, dry, & intact 3/25/2020  7:45 AM   Dressing Type Transparent 3/25/2020  7:45 AM   Hub Color/Line Status Pink 3/25/2020  7:45 AM       Peripheral IV 03/25/20 Right Antecubital (Active)   Site Assessment Clean, dry, & intact 3/25/2020  7:30 AM   Phlebitis Assessment 0 3/25/2020  7:30 AM   Infiltration Assessment 0 3/25/2020  7:30 AM   Dressing Status Clean, dry, & intact 3/25/2020  7:30 AM   Dressing Type Transparent 3/25/2020  7:30 AM   Hub Color/Line Status Pink 3/25/2020  7:30 AM        Opportunity for questions and clarification was provided.       Patient transported with:   LYCEEM

## 2020-03-25 NOTE — ED TRIAGE NOTES
Pt arrived via EMS. Pt code Elkin Suero was last night at 8pm.  Pt woke up with slurred speech and left facial droop. Pt has HX of HTN and diabetes (glucose 118, per EMS). Per EMS pt able to move all 4 extremities and follow commands. Pt in CT with primary RN and Dr. Jones Moody.

## 2020-03-25 NOTE — PROGRESS NOTES
Physical Therapy  Attempted to see pt for PT evaluation. Pt is off the floor for testing. Will check back at later time.   Khang Simmons, PT, DPT

## 2020-03-25 NOTE — H&P
[address]  HISTORY AND PHYSICAL    Name:  Baudilio Gibson  MR#:  [id number]  :  1940  ACCOUNT #:  [de-identified]  ADMIT DATE:  2020    PRIMARY CARE PHYSICIAN:      Not known. PRESENTING COMPLAINT:      Slurred speech and left-sided facial droop. HISTORY OF PRESENTING ILLNESS:      The patient is a 33-year-old -American female who has previous history significant for hypertension, non-insulin dependent diabetes, hyperlipidemia and previous history of stroke who was brought in by ambulance as the patient was not feeling well. I did call her daughter, Bharat Robledo, who tells me she was seen by an eye doctor 2 weeks ago and was told that she had a mini stroke. Last night, the patient started to notice that she had some slurred speech. This morning, the patient had left-sided facial droop  she told her daughter to call the ambulance. The patient was brought in. In the emergency room,  code stroke was  called . CT scan of the head did not show any acute process except for severe chronic microvascular ischemic changes. The patient was given a dose of aspirin and now she will be admitted. The patient denies having any limb weakness. She has no tingling, numbness, headache or shortness of breath or chest pain. PAST MEDICAL HISTORY:      1.  Mild stroke. 2.  Hypertension. 3.  Diabetes. 4.  Hyperlipidemia. 5.  Migraine. SOCIOECONOMIC HISTORY:      The patient does not smoke, she quit few months ago. She does not drink. She lives with her daughter. CODE STATUS:      Full code. MEDICATIONS PRIOR TO ADMISSION:      Cardizem  mg daily, losartan 100 mg daily, metformin 500 mg b.i.d., Lopressor 50 mg b.i.d., Lipitor 40 mg daily, and aspirin 81 mg daily. FAMILY HISTORY:      Significant for hypertension. REVIEW OF SYSTEMS:      Negative except as mentioned in the history of presenting illness.   All systems were reviewed, no other positive finding was noticed. PHYSICAL EXAMINATION:    GENERAL:  The patient is a 17-year-old -American female, who is not in any acute distress. VITAL SIGNS:  Reveal a temperature of 98.8, blood pressure 134/42, pulse is 85, respiratory rate is 18. Oxygen saturation is 100%. HEENT:  Examination reveals pupils are equally reactive to light and accommodation. NECK:  Supple. There is no lymphadenopathy or JVD. CHEST:  Clear. No wheezing. No crackles. CARDIOVASCULAR SYSTEM:  S1 and S2, regular. No murmur. No S3.  ABDOMEN:  Examination reveals no tenderness. No guarding. No rigidity. Bowel sounds are active. EXTREMITIES:  No pedal edema. CNS:  This patient is alert and oriented. He has mild left-sided facial droop and there is no pronator drift. Plantars are downgoing. Cranial nerves are normal.  Cerebellar examination is unremarkable. No sensory deficits. LABORATORY DATA:  White count 6.9, hemoglobin of 13.9, hematocrit 43.2, MCV 93.1, platelet count is 556,050. Chemistries:  Sodium 142, potassium 4.6, chloride 111, bicarb is 26, gap of 5, glucose 123, BUN is 13, creatinine 0.8. Calcium is 8.9, bilirubin 0.4, protein is 7.7, albumin is 3.6, globulin is 4.1. ALT 21, AST 17, alk phos 109. Troponin is less than 0.05. INR is 1. EKG shows normal sinus rhythm with sinus arrhythmia, nonspecific ST and T-wave abnormality. CT head showed no definite acute process, cerebral asymmetry or density in the left anterior segment. CTA with perfusion showed no flow-limiting stenosis or intracranial aneurysm. No major vessel occlusion, severe chronic microvascular ischemic changes with small/moderate remote infarction in the left frontal lobe with thyroid hypodensities. Chest x-ray is pending. ASSESSMENT AND PLAN:      The patient is a 17-year-old female with previous history significant for stroke, diabetes, hypertension, hyperlipidemia is being admitted with:    1.   Left-sided facial droop and slurred speech suggestive of acute stroke. The patient will be admitted. We will order an MRI. Ct showed Severe chronic microvascular ischemic change with small/moderate remote  infarction in the left frontal lobe. 2.  The patient will be continued on Rectal aspirin and high-dose statin. 3.  Obtain echocardiogram.  4.  Monitor on telemetry. The patient is on Cardizem, but does not have any history of atrial fibrillation per her daughter, we will monitor closely. 5.  We will consult Speech Therapy and keep her NPO until cleared by Speech. We will hold metformin as she did get contrast.  6.  Check lipid panels. 7.  Deep venous thrombosis prophylaxis. 8. Neurology consulted. 9. Ct also showed Thyroid hypodensities will check US.       Robson Melgar MD      [id number]/  D:  03/25/2020 8:21  T:  03/25/2020 10:59  JOB #:  [id number] s/p left total hip replacement on 9/25/19

## 2020-03-25 NOTE — PROGRESS NOTES
Attempted to see pt for OT services. Pt is off the floor for testing. Will defer but continue to follow.

## 2020-03-25 NOTE — PROGRESS NOTES
Problem: Self Care Deficits Care Plan (Adult)  Goal: *Acute Goals and Plan of Care (Insert Text)  Description: FUNCTIONAL STATUS PRIOR TO ADMISSION: ambulated without assist devices but reported that she needed a cane (does not have one), sits down in the bottom of the tub to bathe and gets back up on her own, home alone during the day while her daughter works, performed ADLs on her own, had some slurred speech at [facility]: The patient lived with daughter but did not require assist.    Occupational Therapy Goals:  Initiated 3/25/2020  1. Patient will perform grooming standing without cues for location of object on the left with supervision within 7 days. 2. Patient will perform toileting with supervision within 7 days. 3. Patient will perform dressing with supervision within 7 days. 4. Patient will transfer from toilet with supervision using the least restrictive device and appropriate durable medical equipment within 7 days. 5. Patient will improve fugl ingram score by 6 points within 7 days. Outcome: Progressing Towards Goal        OCCUPATIONAL THERAPY EVALUATION  Patient: Daniel Guzmán (73 y.o. female)  Date: 3/25/2020  Primary Diagnosis: Aphasia [R47.01]       Precautions:   Fall    ASSESSMENT  Based on the objective data described below, the patient presents with a new CVA and was very dysarthric and was very difficult to understand. Decreased attention to left side of her visual field but is incorporating BUE together with functional tasks. She reports a previous CVA and stated that she was independent prior to admit. Discoordination noted with finger to nose testing with BUE but pt was able to use BUE. Decreased forward flexion with left shoulder during hand washing and hand over hand assist to push level on soap dispenser. During mobility today pt was veering to the left and was attempting to sit prior completion of transfer.   She is at a high risk for falls at this time. Pocketing noted on left side of mouth with self feeding. Current Level of Function Impacting Discharge (ADLs/self-care):   Transfers:  Sit to Stand: Minimum assistance;Assist x1  Stand to Sit: Minimum assistance;Assist x1  Bed to Chair: Minimum assistance(veers left and left lateral lean)  Bathroom Mobility: Minimum assistance(veers left and lateral rafia)  Toilet Transfer : Minimum assistance(assist to safely align self with commode)    ADL Assessment:  Feeding: Stand-by assistance    Oral Facial Hygiene/Grooming: Minimum assistance    Bathing: Minimum assistance    Upper Body Dressing: Minimum assistance    Lower Body Dressing: Minimum assistance    Toileting: Minimum assistance    Functional Outcome Measure: The patient scored Total A-D  Total A-D (Motor Function): 60/66 on the Fugl-Duran Assessment which is indicative of mild impairment in upper extremity functional status. Other factors to consider for discharge: high risk for falls     Patient will benefit from skilled therapy intervention to address the above noted impairments. PLAN :  Recommendations and Planned Interventions: self care training, functional mobility training, therapeutic exercise, balance training, therapeutic activities, neuromuscular re-education, patient education, home safety training and family training/education    Frequency/Duration: Patient will be followed by occupational therapy 5 times a week to address goals.     Recommendation for discharge: (in order for the patient to meet his/her long term goals)  Therapy 3 hours per day 5-7 days per week pr possible home health pending progress and families ability to assist    This discharge recommendation:  A follow-up discussion with the attending provider and/or case management is planned    IF patient discharges home will need the following DME: rolling walker         SUBJECTIVE:   Patient stated I feel like I am close to what I was doing at home.    OBJECTIVE DATA SUMMARY:   HISTORY:   Past Medical History:   Diagnosis Date    Diabetes ([address]     Hypertension    No past surgical history on file. Prior Level of Function/Environment/Context:   Expanded or extensive additional review of patient history:     Home Situation  Home Environment: Patient room  # Steps to Enter: 0  One/Two Story Residence: One story  Living Alone: No  Support Systems: Child(harris)  Patient Expects to be Discharged to[de-identified] Private residence  Current DME Used/Available at Home: None  Tub or Shower Type: Tub/Shower combination    Hand dominance: Right    EXAMINATION OF PERFORMANCE DEFICITS:  Cognitive/Behavioral Status:  Neurologic State: Alert  Orientation Level: Oriented to person;Oriented to place;Oriented to situation;Oriented to time  Cognition: Decreased attention/concentration; Follows commands(decreased safety awareness)  Perception: Cues to maintain midline in sitting; Tactile;Verbal;Visual;Cues to attend to left side of body;Cues to attend left visual field  Perseveration: No perseveration noted  Safety/Judgement: Fall prevention      Hearing: Auditory  Auditory Impairment: None    Vision/Perceptual:    Tracking: Able to track left of midline              Visual Fields: (intact)            Corrective Lenses: Glasses    Range of Motion:    AROM: Generally decreased, functional  PROM: Generally decreased, functional                      Strength:    Strength: Generally decreased, functional(L UE weaker than R UE)                Coordination:  Coordination: Generally decreased, functional(left UE)  Fine Motor Skills-Upper: Left Intact; Right Intact    Gross Motor Skills-Upper: Left Impaired;Right Impaired    Tone & Sensation:    Tone: Normal  Sensation: Intact                      Balance:  Sitting: Intact  Standing: Impaired  Standing - Static: Constant support; Fair  Standing - Dynamic : Constant support; Fair    Functional Mobility and Transfers for ADLs:  Bed Mobility:  Rolling: Supervision  Supine to Sit: Minimum assistance;Assist x1  Scooting: Moderate assistance; Additional time;Assist x1    Transfers:  Sit to Stand: Minimum assistance;Assist x1  Stand to Sit: Minimum assistance;Assist x1  Bed to Chair: Minimum assistance(veers left and left lateral lean)  Bathroom Mobility: Minimum assistance(veers left and lateral rafia)  Toilet Transfer : Minimum assistance(assist to safely align self with commode)    ADL Assessment:  Feeding: Stand-by assistance    Oral Facial Hygiene/Grooming: Minimum assistance    Bathing: Minimum assistance    Upper Body Dressing: Minimum assistance    Lower Body Dressing: Minimum assistance    Toileting: Minimum assistance                ADL Intervention and task modifications:  Min assist for toileting, min assist standing to wash hands (obtaining soap on left and attention to left visual field)    Donned socks seated EOB crossed leg technique with SBA/CGA       Cognitive Retraining  Safety/Judgement: Fall prevention      Functional Measure:  Fugl-Duran Assessment of Motor Recovery after Stroke:   Reflex Activity  Flexors/Biceps/Fingers: Can be elicited  Extensors/Triceps: Can be elicited  Reflex Subtotal: 4    Volitional Movement Within Synergies  Shoulder Retraction: Full  Shoulder Elevation: Full  Shoulder Abduction (90 degrees): Full  Shoulder External Rotation: Full  Elbow Flexion: Full  Forearm Supination: Full  Shoulder Adduction/Internal Rotation: Full  Elbow Extension: Full  Forearm Pronation: Full  Subtotal: 18    Volitional Movement Mixing Synergies  Hand to Lumbar Spine: Full  Shoulder Flexion (0-90 degrees): Full  Pronation-Supination: Full  Subtotal: 6    Volitional Movement With Little or No Synergy  Shoulder Abduction (0-90 degrees): Full  Shoulder Flexion ( degrees): Full  Pronation/Supination: Full  Subtotal : 6    Normal Reflex Activity  Biceps, Triceps, Finger Flexors:  Full  Subtotal : 2    Upper Extremity Total   Upper Extremity Total: 36    Wrist  Stability at 15 Degree Dorsiflexion: Full  Repeated Dorsiflexion/ Volar Flexion: Partial  Stability at 15 Degree Dorsiflexion: Full  Repeated Dorsiflexion/ Volar Flexion: Partial  Circumduction: Partial  Wrist Total: 7    Hand  Mass Flexion: Full  Mass Extension: Full  Grasp A: Full  Grasp B: Full  Grasp C: Full  Grasp D: Full  Grasp E: Full  Hand Total: 14    Coordination/Speed  Tremor: Slight  Dysmetria: Slight  Time: 2-5s  Coordination/Speed Total : 3    Total A-D  Total A-D (Motor Function): 60/66     This is a reliable/valid measure of arm function after a neurological event. It has established value to characterize functional status and for measuring spontaneous and therapy-induced recovery; tests proximal and distal motor functions. Fugl-Duran Assessment  UE scores recorded between five and 30 days post neurologic event can be used to predict UE recovery at six months post neurologic event. Severe = 0-21 points   Moderately Severe = 22-33 points   Moderate = 34-47 points   Mild = 48-66 points  Rozanna Dandy, D., MILA Delgado, & NAMITA Lopez (1992). Measurement of motor recovery after stroke: Outcome assessment and sample size requirements.  Stroke, 23, pp. 0386-1705.   ------------------------------------------------------------------------------------------------------------------------------------------------------------------  MCID:  Stroke:   [facility] et al, 2001; n = 171; mean age 79 (5) years; assessed within 16 (12) days of stroke, Acute Stroke)  FMA Motor Scores from Admission to Discharge   10 point increase in FMA Upper Extremity = 1.5 change in discharge FIM   10 point increase in FMA Lower Extremity = 1.9 change in discharge FIM  MDC:   Stroke:   Andressa Whitaker et al, 2008, n = 14, mean age = 59.9 (14.6) years, assessed on average 14 (6.5) months post stroke, Chronic Stroke)   FMA = 5.2 points for the Upper Extremity portion of the assessment Occupational Therapy Evaluation Charge Determination   History Examination Decision-Making   MEDIUM Complexity : Expanded review of history including physical, cognitive and psychosocial  history  MEDIUM Complexity : 3-5 performance deficits relating to physical, cognitive , or psychosocial skils that result in activity limitations and / or participation restrictions MEDIUM Complexity : Patient may present with comorbidities that affect occupational performnce. Miniml to moderate modification of tasks or assistance (eg, physical or verbal ) with assesment(s) is necessary to enable patient to complete evaluation       Based on the above components, the patient evaluation is determined to be of the following complexity level: MEDIUM  Pain Ratin/10    Activity Tolerance:   Fair  Please refer to the flowsheet for vital signs taken during this treatment. After treatment patient left in no apparent distress:    Sitting in chair, Call bell within reach and Bed / chair alarm activated    COMMUNICATION/EDUCATION:   The patients plan of care was discussed with: Physical therapist, Registered nurse and patient. Patient was educated regarding her deficit(s) of left inattention/discoordination as this relates to her diagnosis of CVA. She demonstrated Fair understanding as evidenced by verbalization. Patient and/or family was verbally educated on the BE FAST acronym for signs/symptoms of CVA and TIA. BE FAST was written on patient's communication board  for visual education and reinforcement. All questions answered with patient indicating fair understanding.      Thank you for this referral.  Chas Tariq OTR/L  Time Calculation: 25 mins

## 2020-03-25 NOTE — PROGRESS NOTES
Problem: Falls - Risk of  Goal: *Absence of Falls  Description: Document [facility] Fall Risk and appropriate interventions in the flowsheet. Outcome: Progressing Towards Goal  Note: Fall Risk Interventions:  Mobility Interventions: Bed/chair exit alarm, OT consult for ADLs, Patient to call before getting OOB, PT Consult for mobility concerns, PT Consult for assist device competence    Mentation Interventions: Adequate sleep, hydration, pain control, Bed/chair exit alarm, Door open when patient unattended, Evaluate medications/consider consulting pharmacy, Increase mobility, More frequent rounding, Reorient patient, Toileting rounds    Medication Interventions: Bed/chair exit alarm, Patient to call before getting OOB    Elimination Interventions: Bed/chair exit alarm, Call light in reach, Patient to call for help with toileting needs, Stay With Me (per policy), Toilet paper/wipes in reach, Toileting schedule/hourly rounds    History of Falls Interventions: Bed/chair exit alarm         Problem: Patient Education: Go to Patient Education Activity  Goal: Patient/Family Education  Outcome: Progressing Towards Goal     Problem: Patient Education: Go to Patient Education Activity  Goal: Patient/Family Education  Outcome: Progressing Towards Goal     Problem: Patient Education: Go to Patient Education Activity  Goal: Patient/Family Education  Outcome: Progressing Towards Goal     Problem: Pressure Injury - Risk of  Goal: *Prevention of pressure injury  Description: Document Salvatore Scale and appropriate interventions in the flowsheet.   Outcome: Progressing Towards Goal  Note: Pressure Injury Interventions:  Sensory Interventions: Assess changes in LOC, Assess need for specialty bed, Avoid rigorous massage over bony prominences, Discuss PT/OT consult with provider, Chair cushion, Check visual cues for pain, Float heels, Keep linens dry and wrinkle-free, Maintain/enhance activity level, Minimize linen layers, Turn and reposition approx. every two hours (pillows and wedges if needed)    Moisture Interventions: Absorbent underpads, Apply protective barrier, creams and emollients, Assess need for specialty bed, Check for incontinence Q2 hours and as needed, Internal/External urinary devices, Limit adult briefs, Maintain skin hydration (lotion/cream), Minimize layers, Moisture barrier    Activity Interventions: Assess need for specialty bed, Increase time out of bed, PT/OT evaluation    Mobility Interventions: Assess need for specialty bed, Chair cushion, HOB 30 degrees or less, Float heels, PT/OT evaluation, Turn and reposition approx.  every two hours(pillow and wedges)    Nutrition Interventions: Document food/fluid/supplement intake    Friction and Shear Interventions: Apply protective barrier, creams and emollients, Feet elevated on foot rest, Foam dressings/transparent film/skin sealants, HOB 30 degrees or less, Lift sheet, Lift team/patient mobility team, Minimize layers                Problem: Patient Education: Go to Patient Education Activity  Goal: Patient/Family Education  Outcome: Progressing Towards Goal     Problem: Patient Education: Go to Patient Education Activity  Goal: Patient/Family Education  Outcome: Progressing Towards Goal     Problem: TIA/CVA Stroke: 0-24 hours  Goal: Off Pathway (Use only if patient is Off Pathway)  Outcome: Progressing Towards Goal  Goal: Activity/Safety  Outcome: Progressing Towards Goal  Goal: Consults, if ordered  Outcome: Progressing Towards Goal  Goal: Diagnostic Test/Procedures  Outcome: Progressing Towards Goal  Goal: Nutrition/Diet  Outcome: Progressing Towards Goal  Goal: Discharge Planning  Outcome: Progressing Towards Goal  Goal: Medications  Outcome: Progressing Towards Goal  Goal: Respiratory  Outcome: Progressing Towards Goal  Goal: Treatments/Interventions/Procedures  Outcome: Progressing Towards Goal  Goal: Minimize risk of bleeding post-thrombolytic infusion  Outcome: Progressing Towards Goal  Goal: Monitor for complications post-thrombolytic infusion  Outcome: Progressing Towards Goal  Goal: Psychosocial  Outcome: Progressing Towards Goal  Goal: *Hemodynamically stable  Outcome: Progressing Towards Goal  Goal: *Neurologically stable  Description: Absence of additional neurological deficits    Outcome: Progressing Towards Goal  Goal: *Verbalizes anxiety and depression are reduced or absent  Outcome: Progressing Towards Goal  Goal: *Absence of Signs of Aspiration on Current Diet  Outcome: Progressing Towards Goal  Goal: *Absence of deep venous thrombosis signs and symptoms(Stroke Metric)  Outcome: Progressing Towards Goal  Goal: *Ability to perform ADLs and demonstrates progressive mobility and function  Outcome: Progressing Towards Goal  Goal: *Stroke education started(Stroke Metric)  Outcome: Progressing Towards Goal  Goal: *Dysphagia screen performed(Stroke Metric)  Outcome: Progressing Towards Goal  Goal: *Rehab consulted(Stroke Metric)  Outcome: Progressing Towards Goal     Problem: TIA/CVA Stroke: Day 2 Until Discharge  Goal: Off Pathway (Use only if patient is Off Pathway)  Outcome: Progressing Towards Goal  Goal: Activity/Safety  Outcome: Progressing Towards Goal  Goal: Diagnostic Test/Procedures  Outcome: Progressing Towards Goal  Goal: Nutrition/Diet  Outcome: Progressing Towards Goal  Goal: Discharge Planning  Outcome: Progressing Towards Goal  Goal: Medications  Outcome: Progressing Towards Goal  Goal: Respiratory  Outcome: Progressing Towards Goal  Goal: Treatments/Interventions/Procedures  Outcome: Progressing Towards Goal  Goal: Psychosocial  Outcome: Progressing Towards Goal  Goal: *Verbalizes anxiety and depression are reduced or absent  Outcome: Progressing Towards Goal  Goal: *Absence of aspiration  Outcome: Progressing Towards Goal  Goal: *Absence of deep venous thrombosis signs and symptoms(Stroke Metric)  Outcome: Progressing Towards Goal  Goal: *Optimal pain control at patient's stated goal  Outcome: Progressing Towards Goal  Goal: *Tolerating diet  Outcome: Progressing Towards Goal  Goal: *Ability to perform ADLs and demonstrates progressive mobility and function  Outcome: Progressing Towards Goal  Goal: *Stroke education continued(Stroke Metric)  Outcome: Progressing Towards Goal     Problem: Ischemic Stroke: Discharge Outcomes  Goal: *Verbalizes anxiety and depression are reduced or absent  Outcome: Progressing Towards Goal  Goal: *Verbalize understanding of risk factor modification(Stroke Metric)  Outcome: Progressing Towards Goal  Goal: *Hemodynamically stable  Outcome: Progressing Towards Goal  Goal: *Absence of aspiration pneumonia  Outcome: Progressing Towards Goal  Goal: *Aware of needed dietary changes  Outcome: Progressing Towards Goal  Goal: *Verbalize understanding of prescribed medications including anti-coagulants, anti-lipid, and/or anti-platelets(Stroke Metric)  Outcome: Progressing Towards Goal  Goal: *Tolerating diet  Outcome: Progressing Towards Goal  Goal: *Aware of follow-up diagnostics related to anticoagulants  Outcome: Progressing Towards Goal  Goal: *Ability to perform ADLs and demonstrates progressive mobility and function  Outcome: Progressing Towards Goal  Goal: *Absence of DVT(Stroke Metric)  Outcome: Progressing Towards Goal  Goal: *Absence of aspiration  Outcome: Progressing Towards Goal  Goal: *Optimal pain control at patient's stated goal  Outcome: Progressing Towards Goal  Goal: *Home safety concerns addressed  Outcome: Progressing Towards Goal  Goal: *Describes available resources and support systems  Outcome: Progressing Towards Goal  Goal: *Verbalizes understanding of activation of EMS(911) for stroke symptoms(Stroke Metric)  Outcome: Progressing Towards Goal  Goal: *Understands and describes signs and symptoms to report to providers(Stroke Metric)  Outcome: Progressing Towards Goal  Goal: *Neurolgocially stable (absence of additional neurological deficits)  Outcome: Progressing Towards Goal  Goal: *Verbalizes importance of follow-up with primary care physician(Stroke Metric)  Outcome: Progressing Towards Goal  Goal: *Smoking cessation discussed,if applicable(Stroke Metric)  Outcome: Progressing Towards Goal  Goal: *Depression screening completed(Stroke Metric)  Outcome: Progressing Towards Goal     Problem: Patient Education: Go to Patient Education Activity  Goal: Patient/Family Education  Outcome: Progressing Towards Goal

## 2020-03-25 NOTE — ED NOTES
Bedside shift change report (done in CT) given to [employer] (oncoming nurse) by Jhony Granados (offgoing nurse). Report included the following information SBAR and ED Summary.

## 2020-03-25 NOTE — PROGRESS NOTES
Problem: Mobility Impaired (Adult and Pediatric)  Goal: *Acute Goals and Plan of Care (Insert Text)  Description: FUNCTIONAL STATUS PRIOR TO ADMISSION: Patient was independent without use of DME. Reports having balance issues and \"needing a cane\" prior to admit. HOME SUPPORT PRIOR TO ADMISSION: The patient lived alone with daughter to provide assistance. Daughter works intermittently and patient is alone when daughter is at work. Physical Therapy Goals  Initiated 3/25/2020  1. Patient will move from supine to sit and sit to supine  in bed with modified independence within 7 day(s). 2.  Patient will transfer from bed to chair and chair to bed with modified independence using the least restrictive device within 7 day(s). 3.  Patient will perform sit to stand with modified independence within 7 day(s). 4.  Patient will ambulate with modified independence for 250 feet with the least restrictive device within 7 day(s). 5.  Patient will complete CHURCHILL assessment and goal will be set within 7 days. Outcome: Progressing Towards Goal   PHYSICAL THERAPY EVALUATION  Patient: Delaine Angelucci (43 y.o. female)  Date: 3/25/2020  Primary Diagnosis: Aphasia [R47.01]        Precautions:   Fall      ASSESSMENT  Based on the objective data described below, the patient presents with decreased functional mobility from baseline level of function. Currently limited by impaired balance, gait instability, mild L inattention and L sided weakness. Currently needing Ara for bed mobility, transfers and amb with RW. Amb short distance without AD with increased instability. When utiizing RW patient unable to manage RW and needs Ara-moA to manage walker as she tends to deviate to the L. She is overall a poor historian as well. Anticipate she may need some rehab prior to DC home if daughter unable to provide level of assist she needs.     Current Level of Function Impacting Discharge (mobility/balance): Ara for transfers and gait with RW    Other factors to consider for discharge: at risk for falls, below functional baseline, poor historian and does not have full time assistance at home (daughter works)     Patient will benefit from skilled therapy intervention to address the above noted impairments. PLAN :  Recommendations and Planned Interventions: bed mobility training, transfer training, gait training, therapeutic exercises, neuromuscular re-education, patient and family training/education, and therapeutic activities      Frequency/Duration: Patient will be followed by physical therapy:  5 times a week to address goals. Recommendation for discharge: (in order for the patient to meet his/her long term goals)  Physical therapy at least 2 days/week in the home AND ensure assist and/or supervision for safety with mobility VS rehab stay pending progress and daughter's availability to assist at home       IF patient discharges home will need the following DME: to be determined (TBD)-May need RW if DC home         SUBJECTIVE:   Patient stated I usually walk okay but I need a cane. I had a stroke and needed a cane but never got one    OBJECTIVE DATA SUMMARY:   HISTORY:    Past Medical History:   Diagnosis Date    Diabetes ([facility] [address]     Hypertension    No past surgical history on file.     Personal factors and/or comorbidities impacting plan of care:     Home Situation  Home Environment: Patient room  # Steps to Enter: 0  One/Two Story Residence: One story  Living Alone: No  Support Systems: Child(harris)  Patient Expects to be Discharged to[de-identified] Private residence  Current DME Used/Available at Home: None  Tub or Shower Type: Tub/Shower combination    EXAMINATION/PRESENTATION/DECISION MAKING:   Critical Behavior:  Neurologic State: Alert  Orientation Level: Oriented to person, Oriented to place, Oriented to situation, Disoriented to time  Cognition: Follows commands  Safety/Judgement: Awareness of environment  Hearing: Auditory  Auditory Impairment: None    Range Of Motion:  AROM: Generally decreased, functional                       Strength:    Strength: Generally decreased, functional(L UE weaker than R UE)       Functional Mobility:  Bed Mobility:  Rolling: Supervision  Supine to Sit: Minimum assistance;Assist x1     Scooting: Moderate assistance; Additional time;Assist x1  Transfers:  Sit to Stand: Minimum assistance;Assist x1  Stand to Sit: Minimum assistance;Assist x1                       Balance:   Sitting: Intact  Standing: Impaired  Standing - Static: Constant support; Fair  Standing - Dynamic : Constant support; Fair  Ambulation/Gait Training:  Distance (ft): 65 Feet (ft)  Assistive Device: Gait belt;Walker, rolling  Ambulation - Level of Assistance: Minimal assistance; Additional time;Assist x1(mainly for RW management)     Gait Description (WDL): Exceptions to WDL  Gait Abnormalities: Decreased step clearance; Hemiplegic; Path deviations        Base of Support: Center of gravity altered; Widened  Stance: Left decreased  Speed/Giana: Pace decreased (<100 feet/min); Shuffled; Slow  Step Length: Left shortened       Pain Rating:  No c/o pain    Activity Tolerance:   Fair and requires rest breaks  Please refer to the flowsheet for vital signs taken during this treatment. After treatment patient left in no apparent distress:   Sitting in chair, Call bell within reach, and Bed / chair alarm activated    COMMUNICATION/EDUCATION:   The patients plan of care was discussed with: Physical therapist, Occupational therapist, and Registered nurse. Fall prevention education was provided and the patient/caregiver indicated understanding., Patient/family have participated as able in goal setting and plan of care. , and Patient/family agree to work toward stated goals and plan of care.     Thank you for this referral.  Viry Johnson, PT, DPT   Time Calculation: 25 mins

## 2020-03-25 NOTE — PROGRESS NOTES
* No surgery found *  * No surgery found *  Bedside shift change report given to Nikita (oncoming nurse) by [place] (offgoing nurse). Report included the following information SBAR, Kardex, ED Summary and Intake/Output. Zone Phone:   [phone]    Significant changes during shift:  Patient was admitted to [facility]. Patient Marii Art  70 y.o.  3/25/2020  7:10 AM by Alison Man MD. Rosy Fallon was admitted from Home    Problem List    Patient Active Problem List    Diagnosis Date Noted    Aphasia 03/25/2020    Bilateral carotid artery stenosis 03/25/2020     Past Medical History:   Diagnosis Date    Diabetes ([facility] [address]     Hypertension      Core Measures:    CVA: Yes Yes  CHF:No No  PNA:No No    Activity Status:    OOB to Chair Yes  Ambulated this shift Yes   Bed Rest No    LINES AND DRAINS:    PIV    DVT prophylaxis:    DVT prophylaxis Med- Yes  DVT prophylaxis SCD or SYLVIA- No     Wounds: (If Applicable)    Wounds- No    Patient Safety:    Falls Score Total Score: 3  Safety Level_______  Bed Alarm On? Yes  Sitter?  No    Plan for upcoming shift: safety and discharge planning, eeg    Discharge Plan:TBD    Active Consults:  IP CONSULT TO HOSPITALIST  IP CONSULT TO NEUROLOGY

## 2020-03-25 NOTE — PROGRESS NOTES
SPEECH LANGUAGE PATHOLOGY BEDSIDE SWALLOW EVALUATION and  MOTOR SPEECH  Patient: Lee Prado (81 y.o. female)  Date: 3/25/2020  Primary Diagnosis: Aphasia [R47.01]        Precautions:       ASSESSMENT :  Based on the objective data described below, the patient presents with mild oropharyngeal dysphagia characterized by decreased seal on left, prolonged mastication of solid secondary to edentulous state; however, complete oral clearance achieved. Pharyngeal dysphagia characterized by suspected mildly delayed swallow initiation via palpation. No overt s/s aspiration with cup/straw sips of thin (~8oz), puree or solid. Recommend dental soft consistency at this time. Dysarthria:  Patient reports speech is not at baseline. Patient presenting with moderate dysarthria characterized by imprecise articulation, blended word boundaries and rapid rate which are significantly impacting intelligibility. Educated patient on compensatory speech strategies to improved intelligibility; however, patient with difficulty repeating back and utilizing them in simple automatic sequences and repetition of sentences. Patient with poor insight into speech deficits. Patient will benefit from continued speech therapy in order to address dysarthria and to ensure diet tolerance. 15:42 spoke with OT who reports patient was pocketing foods (green beans from lunch tray). Given this, recommend diet downgrade to mechanically altered. Patient will benefit from skilled intervention to address the above impairments.   Patients rehabilitation potential is considered to be Fair     PLAN :  Recommendations and Planned Interventions:  --dysphagia 2 diet/ thin liquids  -- meds 1 at a time as tolerated  -- strict upright positioning with any PO  -- slp to follow for diet tolerance and motor speech treatment  -- pending MRI results, patient may need further evaluation of integrated language    Frequency/Duration: Patient will be followed by speech-language pathology 2 times a week to address goals of dysphagia, 3x for dysarthria  Discharge Recommendations: To Be Determined     SUBJECTIVE:   Patient stated my  was at this hospital before he . OBJECTIVE:     Past Medical History:   Diagnosis Date    Diabetes ([address]     Hypertension    No past surgical history on file. Prior Level of Function/Home Situation:   Home Situation  Home Environment: Private residence  # Steps to Enter: 0  One/Two Story Residence: One story  Living Alone: No  Support Systems: Child(harris)  Patient Expects to be Discharged to[de-identified] Private residence  Current DME Used/Available at Home: None  Diet prior to admission: regular/thin  Current Diet:  npo   Cognitive and Communication Status:  Neurologic State: Alert  Orientation Level: Oriented to person, Oriented to place, Oriented to situation, Disoriented to time  Cognition: Follows commands  Perception: Appears intact  Perseveration: No perseveration noted  Safety/Judgement: Awareness of environment  Oral Assessment:  Oral Assessment  Labial: Left droop; Decreased rate  Dentition: Edentulous(reports has dentures but doesn;t wear to eat)  Oral Hygiene: clean, moist  Lingual: Decreased rate  Velum: Unable to visualize  Mandible: No impairment  P.O. Trials:  Patient Position: (up in  bed)  Vocal quality prior to P.O.: No impairment  Consistency Presented: Thin liquid; Solid;Puree; Ice chips  How Presented: Successive swallows;Straw;Cup/sip; Self-fed/presented;Spoon     Bolus Acceptance: No impairment  Bolus Formation/Control: Impaired  Type of Impairment: Delayed  Propulsion: No impairment  Oral Residue: None  Initiation of Swallow: Delayed (# of seconds)(mild)  Laryngeal Elevation: Functional  Aspiration Signs/Symptoms: None  Pharyngeal Phase Characteristics: Double swallow  Effective Modifications: Small sips and bites          Oral Phase Severity: Mild  Pharyngeal Phase Severity : Mild    NOMS:   The NOMS functional outcome measure was used to quantify this patient's level of swallowing impairment. Based on the NOMS, the patient was determined to be at level 6 for swallow function       NOMS Swallowing Levels:  Level 1 (CN): NPO  Level 2 (CM): NPO but takes consistency in therapy  Level 3 (CL): Takes less than 50% of nutrition p.o. and continues with nonoral feedings; and/or safe with mod cues; and/or max diet restriction  Level 4 (CK): Safe swallow but needs mod cues; and/or mod diet restriction; and/or still requires some nonoral feeding/supplements  Level 5 (CJ): Safe swallow with min diet restriction; and/or needs min cues  Level 6 (CI): Independent with p.o.; rare cues; usually self cues; may need to avoid some foods or needs extra time  Level 7 ([address] [place] [address]): Independent for all p.o.  DAVIAN. (2003). National Outcomes Measurement System (NOMS): Adult Speech-Language Pathology User's Guide. Motor Speech:  Oral-Motor Structure/Motor Speech  Labial: Left droop; Decreased rate  Dentition: Edentulous(reports has dentures but doesn;t wear to eat)  Oral Hygiene: clean, moist  Lingual: Decreased rate  Velum: Unable to visualize  Mandible: No impairment  Apraxic Characteristics: None  Dysarthric Characteristics: Blended word boundaries; Imprecise; Increased rate  Intelligibility: Impaired  Sentence Intelligibility (%): (75)  Conversation Intelligibility (%): 60 %        Pain:  Pain Scale 1: Numeric (0 - 10)  Pain Intensity 1: 0       After treatment:   Patient left in no apparent distress in bed, Call bell within reach, Nursing notified, and Bed / chair alarm activated    COMMUNICATION/EDUCATION:   Patient was educated regarding her deficit(s) of dysarthria, dysphagia as this relates to her diagnosis of possible cva. She demonstrated Good understanding as evidenced by verbalization. The patient's plan of care including recommendations, planned interventions, and recommended diet changes were discussed with: Registered nurse.      Patient/family have participated as able in goal setting and plan of care. Patient/family agree to work toward stated goals and plan of care.     Thank you for this referral.  Zach Velazquez, SLP  Time Calculation: 20 mins

## 2020-03-26 ENCOUNTER — APPOINTMENT (OUTPATIENT)
Dept: CT IMAGING | Age: 80
DRG: 064 | End: 2020-03-26
Attending: PSYCHIATRY & NEUROLOGY
Payer: MEDICARE

## 2020-03-26 ENCOUNTER — APPOINTMENT (OUTPATIENT)
Dept: GENERAL RADIOLOGY | Age: 80
DRG: 064 | End: 2020-03-26
Attending: PSYCHIATRY & NEUROLOGY
Payer: MEDICARE

## 2020-03-26 LAB
CHOLEST SERPL-MCNC: 141 MG/DL
COMMENT, HOLDF: NORMAL
EST. AVERAGE GLUCOSE BLD GHB EST-MCNC: 114 MG/DL
GLUCOSE BLD STRIP.AUTO-MCNC: 101 MG/DL (ref 65–100)
GLUCOSE BLD STRIP.AUTO-MCNC: 108 MG/DL (ref 65–100)
GLUCOSE BLD STRIP.AUTO-MCNC: 110 MG/DL (ref 65–100)
GLUCOSE BLD STRIP.AUTO-MCNC: 132 MG/DL (ref 65–100)
GLUCOSE BLD STRIP.AUTO-MCNC: 78 MG/DL (ref 65–100)
HBA1C MFR BLD: 5.6 % (ref 4–5.6)
HDLC SERPL-MCNC: 70 MG/DL
HDLC SERPL: 2 {RATIO} (ref 0–5)
LDLC SERPL CALC-MCNC: 59.4 MG/DL (ref 0–100)
LIPID PROFILE,FLP: NORMAL
SAMPLES BEING HELD,HOLD: NORMAL
SERVICE CMNT-IMP: ABNORMAL
SERVICE CMNT-IMP: NORMAL
TRIGL SERPL-MCNC: 58 MG/DL (ref ?–150)
VLDLC SERPL CALC-MCNC: 11.6 MG/DL

## 2020-03-26 PROCEDURE — 74011250637 HC RX REV CODE- 250/637: Performed by: EMERGENCY MEDICINE

## 2020-03-26 PROCEDURE — 74230 X-RAY XM SWLNG FUNCJ C+: CPT

## 2020-03-26 PROCEDURE — 82962 GLUCOSE BLOOD TEST: CPT

## 2020-03-26 PROCEDURE — 65660000000 HC RM CCU STEPDOWN

## 2020-03-26 PROCEDURE — 36415 COLL VENOUS BLD VENIPUNCTURE: CPT

## 2020-03-26 PROCEDURE — 94760 N-INVAS EAR/PLS OXIMETRY 1: CPT

## 2020-03-26 PROCEDURE — 74011250637 HC RX REV CODE- 250/637: Performed by: PSYCHIATRY & NEUROLOGY

## 2020-03-26 PROCEDURE — 70450 CT HEAD/BRAIN W/O DYE: CPT

## 2020-03-26 PROCEDURE — 74011250636 HC RX REV CODE- 250/636: Performed by: HOSPITALIST

## 2020-03-26 PROCEDURE — 77030038269 HC DRN EXT URIN PURWCK BARD -A

## 2020-03-26 PROCEDURE — 83036 HEMOGLOBIN GLYCOSYLATED A1C: CPT

## 2020-03-26 PROCEDURE — 92611 MOTION FLUOROSCOPY/SWALLOW: CPT

## 2020-03-26 PROCEDURE — 92526 ORAL FUNCTION THERAPY: CPT

## 2020-03-26 PROCEDURE — 74011250637 HC RX REV CODE- 250/637: Performed by: HOSPITALIST

## 2020-03-26 PROCEDURE — 80061 LIPID PANEL: CPT

## 2020-03-26 RX ORDER — ASPIRIN 325 MG
325 TABLET ORAL DAILY
Status: DISCONTINUED | OUTPATIENT
Start: 2020-03-26 | End: 2020-03-29 | Stop reason: HOSPADM

## 2020-03-26 RX ORDER — METOPROLOL TARTRATE 5 MG/5ML
2.5 INJECTION INTRAVENOUS ONCE
Status: COMPLETED | OUTPATIENT
Start: 2020-03-27 | End: 2020-03-27

## 2020-03-26 RX ADMIN — LOSARTAN POTASSIUM 100 MG: 50 TABLET, FILM COATED ORAL at 09:30

## 2020-03-26 RX ADMIN — Medication 10 ML: at 22:45

## 2020-03-26 RX ADMIN — METOPROLOL TARTRATE 50 MG: 50 TABLET, FILM COATED ORAL at 09:30

## 2020-03-26 RX ADMIN — ACETAMINOPHEN 650 MG: 325 TABLET ORAL at 22:43

## 2020-03-26 RX ADMIN — ENOXAPARIN SODIUM 40 MG: 40 INJECTION SUBCUTANEOUS at 18:14

## 2020-03-26 RX ADMIN — METOPROLOL TARTRATE 50 MG: 50 TABLET, FILM COATED ORAL at 18:14

## 2020-03-26 RX ADMIN — ATORVASTATIN CALCIUM 40 MG: 40 TABLET, FILM COATED ORAL at 22:43

## 2020-03-26 RX ADMIN — Medication 10 ML: at 06:46

## 2020-03-26 RX ADMIN — Medication 10 ML: at 13:26

## 2020-03-26 RX ADMIN — DILTIAZEM HYDROCHLORIDE 240 MG: 120 CAPSULE, COATED, EXTENDED RELEASE ORAL at 09:30

## 2020-03-26 RX ADMIN — ASPIRIN 325 MG ORAL TABLET 325 MG: 325 PILL ORAL at 09:30

## 2020-03-26 NOTE — PROGRESS NOTES
Patient's speech is more slurred than yesterday and had difficulty swallowing her pills with thin liquids. Speech therapist and neurologist were notified and a barium swallow was ordered as well as CT of her head. Patient did well with pills whole in applesauce.

## 2020-03-26 NOTE — PROGRESS NOTES
* No surgery found *  * No surgery found *  Bedside shift change report given to Levy Brown [id number] (oncoming nurse) by [place] (offgoing nurse). Report included the following information SBAR, Kardex, ED Summary and Intake/Output. Zone Phone:   [phone]    Significant changes during shift:  Diet was changed, instructions are posted at patient's head of bed, CT scan was performed as well as barium swallow    Patient Information    Rosy Fallon  70 y.o.  3/25/2020  7:10 AM by Alison Man MD. Rosy Fallon was admitted from Home    Problem List    Patient Active Problem List    Diagnosis Date Noted    Aphasia 03/25/2020    Bilateral carotid artery stenosis 03/25/2020     Past Medical History:   Diagnosis Date    Diabetes ([facility] [address]     Hypertension      Core Measures:    CVA: Yes Yes  CHF:No No  PNA:No No    Activity Status:    OOB to Chair Yes  Ambulated this shift Yes   Bed Rest No    LINES AND DRAINS:    PIV    DVT prophylaxis:    DVT prophylaxis Med- Yes  DVT prophylaxis SCD or SYLVIA- No     Wounds: (If Applicable)    Wounds- No    Patient Safety:    Falls Score Total Score: 4  Safety Level_______  Bed Alarm On? Yes  Sitter?  No    Plan for upcoming shift: safety and discharge planning    Discharge Plan:TBD    Active Consults:  IP CONSULT TO HOSPITALIST  IP CONSULT TO NEUROLOGY

## 2020-03-26 NOTE — PROGRESS NOTES
Problem: Falls - Risk of  Goal: *Absence of Falls  Description: Document Bethel Presrodger Fall Risk and appropriate interventions in the flowsheet. Outcome: Progressing Towards Goal  Note: Fall Risk Interventions:  Mobility Interventions: Bed/chair exit alarm, Patient to call before getting OOB, Communicate number of staff needed for ambulation/transfer, PT Consult for mobility concerns, PT Consult for assist device competence, Strengthening exercises (ROM-active/passive), Utilize walker, cane, or other assistive device    Mentation Interventions: Adequate sleep, hydration, pain control, Bed/chair exit alarm, Toileting rounds, Room close to nurse's station, Reorient patient, More frequent rounding    Medication Interventions: Bed/chair exit alarm, Patient to call before getting OOB, Teach patient to arise slowly    Elimination Interventions: Bed/chair exit alarm, Call light in reach, Patient to call for help with toileting needs, Stay With Me (per policy), Toileting schedule/hourly rounds    History of Falls Interventions: Consult care management for discharge planning, Bed/chair exit alarm         Problem: Patient Education: Go to Patient Education Activity  Goal: Patient/Family Education  Outcome: Progressing Towards Goal     Problem: Patient Education: Go to Patient Education Activity  Goal: Patient/Family Education  Outcome: Progressing Towards Goal     Problem: Patient Education: Go to Patient Education Activity  Goal: Patient/Family Education  Outcome: Progressing Towards Goal     Problem: Pressure Injury - Risk of  Goal: *Prevention of pressure injury  Description: Document Salvatore Scale and appropriate interventions in the flowsheet.   Outcome: Progressing Towards Goal  Note: Pressure Injury Interventions:  Sensory Interventions: Assess changes in LOC, Assess need for specialty bed, Avoid rigorous massage over bony prominences, Chair cushion, Check visual cues for pain, Discuss PT/OT consult with provider, Float heels, Maintain/enhance activity level, Keep linens dry and wrinkle-free, Minimize linen layers, Monitor skin under medical devices, Pad between skin to skin, Turn and reposition approx. every two hours (pillows and wedges if needed)    Moisture Interventions: Absorbent underpads, Check for incontinence Q2 hours and as needed, Internal/External urinary devices, Offer toileting Q_hr, Minimize layers    Activity Interventions: Assess need for specialty bed, Increase time out of bed, Pressure redistribution bed/mattress(bed type), PT/OT evaluation    Mobility Interventions: Assess need for specialty bed, Chair cushion, Float heels, PT/OT evaluation, Turn and reposition approx.  every two hours(pillow and wedges)    Nutrition Interventions: Document food/fluid/supplement intake, Offer support with meals,snacks and hydration    Friction and Shear Interventions: Apply protective barrier, creams and emollients, Minimize layers, HOB 30 degrees or less                Problem: Patient Education: Go to Patient Education Activity  Goal: Patient/Family Education  Outcome: Progressing Towards Goal     Problem: Patient Education: Go to Patient Education Activity  Goal: Patient/Family Education  Outcome: Progressing Towards Goal     Problem: TIA/CVA Stroke: 0-24 hours  Goal: Off Pathway (Use only if patient is Off Pathway)  Outcome: Progressing Towards Goal  Goal: Activity/Safety  Outcome: Progressing Towards Goal  Goal: Consults, if ordered  Outcome: Progressing Towards Goal  Goal: Diagnostic Test/Procedures  Outcome: Progressing Towards Goal  Goal: Nutrition/Diet  Outcome: Progressing Towards Goal  Goal: Discharge Planning  Outcome: Progressing Towards Goal  Goal: Medications  Outcome: Progressing Towards Goal  Goal: Respiratory  Outcome: Progressing Towards Goal  Goal: Treatments/Interventions/Procedures  Outcome: Progressing Towards Goal  Goal: Minimize risk of bleeding post-thrombolytic infusion  Outcome: Progressing Towards Goal  Goal: Monitor for complications post-thrombolytic infusion  Outcome: Progressing Towards Goal  Goal: Psychosocial  Outcome: Progressing Towards Goal  Goal: *Hemodynamically stable  Outcome: Progressing Towards Goal  Goal: *Neurologically stable  Description: Absence of additional neurological deficits    Outcome: Progressing Towards Goal  Goal: *Verbalizes anxiety and depression are reduced or absent  Outcome: Progressing Towards Goal  Goal: *Absence of Signs of Aspiration on Current Diet  Outcome: Progressing Towards Goal  Goal: *Absence of deep venous thrombosis signs and symptoms(Stroke Metric)  Outcome: Progressing Towards Goal  Goal: *Ability to perform ADLs and demonstrates progressive mobility and function  Outcome: Progressing Towards Goal  Goal: *Stroke education started(Stroke Metric)  Outcome: Progressing Towards Goal  Goal: *Dysphagia screen performed(Stroke Metric)  Outcome: Progressing Towards Goal  Goal: *Rehab consulted(Stroke Metric)  Outcome: Progressing Towards Goal     Problem: Patient Education: Go to Patient Education Activity  Goal: Patient/Family Education  Outcome: Progressing Towards Goal

## 2020-03-26 NOTE — PROGRESS NOTES
Problem: Dysphagia (Adult)  Goal: *Acute Goals and Plan of Care (Insert Text)  Description: Speech pathology goals initiated 3/25/2020   1. Patient will tolerate a dental soft diet/ thin liquids free of s/s aspiration within 7 days  DISCONTINUED  2. Added 3/26/2020 patient will participate in [facility] for objective assessment  MET 3/26/2020   3. Added 3/26/2020 patient will tolerate a puree diet/ nectar thick liquids free of s/s aspiration within 7 days       3/26/2020 1253 by LIAM Galan  Outcome: Progressing Towards Goal  3/26/2020 1243 by Mendez Vasquez, SLP  Outcome: Not Progressing Towards Goal  3/26/2020 1205 by LIAM Galan  Outcome: Progressing Towards Goal   SPEECH PATHOLOGY MODIFIED BARIUM SWALLOW STUDY  And post treatment   Patient: Boris Singh (19 y.o. female)  Date: 3/26/2020  Primary Diagnosis: Aphasia [R47.01]        Precautions: aspiration Fall    ASSESSMENT :  Based on the objective data described below, the patient presents with mod-severe oral and moderate pharyngeal dysphagia. Oral dysphagia characterized by anterior spillage, drooling, prolonged bolus prep and posterior propulsion, inability to extract bolus via straw, diffuse oral residue of all consistencies, absent mastication of solid requiring expectoration, piecemeal swallowing and premature spillage. Pharyngeal dysphagia characterized by decreased tongue base retraction, delayed swallow initiation occurring at the pyriform sinuses with thins and vallecula with puree (with significantly prolonged pooling at times up to 16 seconds), reduce anterior hyoid excursion with laryngeal gap. Secondary to delay along with incomplete closure, patient with flash/trace penetration that clears with the force of the swallow. Patient with mild tongue base residue but no pharyngeal residue. Her double swallows are a result of piecemeal swallow rather than pharyngeal residue.  Patient with no aspiration with cup sips of thin. No coughing during study despite cough bedside. Patient at increased risk of aspiration with all PO consistencies given severity of oral phase and delayed swallow initiation with liquids and puree. Treatment:  Initial diet order of dysphagia 2/thin (from yesterday) was still in place and patient received immediately following mbs. Arrived to room as patent attempting to eat and had significant oral residue of pasta, anterior spillage and coughing following thins despite no aspiration on mbs (also had no cough on study). Given overt s/s penetration/aspiration bedside and severity of oral phase, recommend nectar thick liquids via small, single sip only. Patient will benefit from skilled intervention to address the above impairments. Patients rehabilitation potential is considered to be Guarded     PLAN :  Recommendations and Planned Interventions:  -- Puree diet/ NECTAR thick liquids (given overt coughing with thins bedside despite no aspiration on mbs)  -- NO STRAW  - CRUSH MEDS  -- STRICT UPRIGHT with ANY PO!!! This is imperative for safety  -- limit distractions with PO    Frequency/Duration: Patient will be followed by speech-language pathology 4 times a week to address goals. Discharge Recommendations: Rehab     SUBJECTIVE:   Patient alert but confused, severely dysarthric    OBJECTIVE:     Past Medical History:   Diagnosis Date    Diabetes ([facility] [address]     Hypertension    No past surgical history on file.   Prior Level of Function/Home Situation:   Home Situation  Home Environment: Patient room  # Steps to Enter: 0  One/Two Story Residence: One story  Living Alone: No  Support Systems: Child(harris)  Patient Expects to be Discharged to[de-identified] Private residence  Current DME Used/Available at Home: None  Tub or Shower Type: Tub/Shower combination  Diet prior to admission: regular/thin  Current Diet:  dysphagia 2/ thin    Radiologist: (Dr. Shilpa Ya )Dr. Jenkins Lawrence: Lateral;Fluoro  Patient Position: (up in hausted chair)    Trial 1: Trial 2:   Consistency Presented: Thin liquid Consistency Presented: Puree; Solid   How Presented: Cup/sip; Self-fed/presented How Presented: SLP-fed/presented;Spoon   Consistency Amount: (2oz) Consistency Amount: (2 Tbsp puree, 1/4 cracker)   Bolus Acceptance: No impairment Bolus Acceptance: No impairment   Bolus Formation/Control: Impaired: Anterior;Spillage;Premature spillage;Delayed;Drooling Bolus Formation/Control: Impaired: Delayed; Incomplete;Mastication   Propulsion: Delayed (# of seconds) Propulsion: Delayed (# of seconds)   Oral Residue: None Oral Residue: Lingual;Left   Initiation of Swallow: Triggered at pyriform sinus(es) Initiation of Swallow: Triggered at valleculae   Timing: Pooling 1-5 sec Timing: Prolonged pooling 11-29 sec(with puree despite repeated cues- inconsistent)   Penetration: During swallow;Flash/transient Penetration: None   Aspiration/Timing: No evidence of aspiration Aspiration/Timing: No evidence of aspiration   Pharyngeal Clearance: No residue Pharyngeal Clearance: Less than 10%(mild tongue base residue)                               Decreased Tongue Base Retraction?: Yes  Laryngeal Elevation: Incomplete laryngeal closure  Aspiration/Penetration Score: 2 (Penetration/No residue-Contrast enters the airway penetrates, remains above the folds/cords, and is cleared)  Pharyngeal Symmetry: Not assessed  Pharyngeal-Esophageal Segment: No impairment  Pharyngeal Dysfunction: Decreased tongue base retraction;Decreased elevation/closure    Oral Phase Severity: Moderate-severe  Pharyngeal Phase Severity: Moderate  NOMS:   The NOMS functional outcome measure was used to quantify this patient's level of swallowing impairment.   Based on the NOMS, the patient was determined to be at level 3 for swallow function         NOMS Swallowing Levels:  Level 1 (CN): NPO  Level 2 (CM): NPO but takes consistency in therapy  Level 3 (CL): Takes less than 50% of nutrition p.o. and continues with nonoral feedings; and/or safe with mod cues; and/or max diet restriction  Level 4 (CK): Safe swallow but needs mod cues; and/or mod diet restriction; and/or still requires some nonoral feeding/supplements  Level 5 (CJ): Safe swallow with min diet restriction; and/or needs min cues  Level 6 (CI): Independent with p.o.; rare cues; usually self cues; may need to avoid some foods or needs extra time  Level 7 ([address] [place] [address]): Independent for all p.o.  DAVIAN. (2003). National Outcomes Measurement System (NOMS): Adult Speech-Language Pathology User's Guide. COMMUNICATION/EDUCATION:   Patient was educated regarding Her deficit(s) of dysphagia as this relates to Her diagnosis of CVA. She demonstrated Guarded understanding as evidenced by confusion. The patients plan of care including findings from [facility], recommendations, planned interventions, and recommended diet changes were discussed with: Speech therapist.     Patient is unable to participate in goal setting and plan of care.     Thank you for this referral.  Maria Fernanda Fischer, SLP  Time Calculation: 12 mins

## 2020-03-26 NOTE — PROGRESS NOTES
Occupational Therapy  Medical record reviewed. Nursing reports that pt is off the floor for MBS testing. There are concerns for pt's swallowing and increased weakness. Pt will also have a CT scan this date.   Will defer and continue to follow as appropriate

## 2020-03-26 NOTE — PROGRESS NOTES
Chief Complaint:  stroke    Patient sitting up in bed. Spoke with speech therapist who stated that her speech has worsened significantly. Patient is no longer able to support her own weight on standing. Assesment and Plan  1. Stroke in evolution  Repeat CT to rule out bleed  If no bleed continue Plavix pending swallow evaluation  Sudden deterioration    2. Dysphagia  Acute worsening  Arrange for barium swallow after CT    3. Hypertension  Continue Cozaar    4. Hyperlipidemia  Continue atorvastatin    5. Altered mental status  Normal EEG      Allergies  Patient has no known allergies.      Medications  Current Facility-Administered Medications   Medication Dose Route Frequency    aspirin tablet 325 mg  325 mg Oral DAILY    acetaminophen (TYLENOL) tablet 650 mg  650 mg Oral Q6H PRN    ondansetron (ZOFRAN) injection 4 mg  4 mg IntraVENous Q4H PRN    sodium chloride (NS) flush 5-40 mL  5-40 mL IntraVENous Q8H    sodium chloride (NS) flush 5-40 mL  5-40 mL IntraVENous PRN    enoxaparin (LOVENOX) injection 40 mg  40 mg SubCUTAneous Q24H    metoprolol tartrate (LOPRESSOR) tablet 50 mg  50 mg Oral BID    losartan (COZAAR) tablet 100 mg  100 mg Oral DAILY    atorvastatin (LIPITOR) tablet 40 mg  40 mg Oral QHS    dilTIAZem CD (CARDIZEM CD) capsule 240 mg  240 mg Oral DAILY    insulin lispro (HUMALOG) injection   SubCUTAneous TIDAC    glucose chewable tablet 16 g  4 Tab Oral PRN    dextrose (D50W) injection syrg 12.5-25 g  12.5-25 g IntraVENous PRN    glucagon (GLUCAGEN) injection 1 mg  1 mg IntraMUSCular PRN    influenza vaccine 2019-20 (6 mos+)(PF) (FLUARIX/FLULAVAL/FLUZONE QUAD) injection 0.5 mL  0.5 mL IntraMUSCular PRIOR TO DISCHARGE    acetaminophen (TYLENOL) tablet 650 mg  650 mg Oral Q4H PRN    Or    acetaminophen (TYLENOL) solution 650 mg  650 mg Per NG tube Q4H PRN    Or    acetaminophen (TYLENOL) suppository 650 mg  650 mg Rectal Q4H PRN        Medical History  Past Medical History: Diagnosis Date    Diabetes ([facility]     Hypertension        Review of Systems   Reason unable to perform ROS: severe dysarthria. Exam:    Visit Vitals  /84   Pulse 69   Temp 98.2 °F (36.8 °C)   Resp 20   Ht 5' 2\" (1.575 m)   Wt 156 lb 8.4 oz (71 kg)   SpO2 97%   Breastfeeding No   BMI 28.63 kg/m²      General: Well developed, well nourished. Patient in no apparent distress   Head: Normocephalic, atraumatic, anicteric sclera   Neck Normal ROM, No thyromegally   Lungs:  Clear to auscultation bilaterally, No wheezes or rubs   Cardiac: Regular rate and rhythm with no murmurs. Abd: Bowel sounds were audible. No tenderness on palpation   Ext: No pedal edema   Skin: Supple no rash      NeurologicExam:  Mental Status:  Oriented to person place but not time appears confused, answers inconsistently   Speech:  Severe dysarthria   Cranial Nerves:  II - XII Intact subtle left facial droop   Motor:   Subtle left upper and lower extremity weakness   Reflexes:   Deep tendon reflexes 1+/4 and symmetric. Sensory:    Sensory loss on the left   Gait:  Cant support weight    Tremor:   No tremor noted.        Lab Review  Lab Results   Component Value Date/Time    WBC 6.9 03/25/2020 07:20 AM    HCT 43.2 03/25/2020 07:20 AM    HGB 13.9 03/25/2020 07:20 AM    PLATELET 104 34/91/6133 07:20 AM       Lab Results   Component Value Date/Time    Sodium 142 03/25/2020 07:20 AM    Potassium 4.3 03/25/2020 07:20 AM    Chloride 111 (H) 03/25/2020 07:20 AM    CO2 26 03/25/2020 07:20 AM    Glucose 123 (H) 03/25/2020 07:20 AM    BUN 13 03/25/2020 07:20 AM    Creatinine 0.81 03/25/2020 07:20 AM    Calcium 8.9 03/25/2020 07:20 AM       Lab Results   Component Value Date/Time    Hemoglobin A1c 5.6 03/26/2020 03:27 AM        Lab Results   Component Value Date/Time    MERRILL by IFA,IgG <1:40 10/04/2009 02:15 AM       Lab Results   Component Value Date/Time    Cholesterol, total 141 03/26/2020 03:27 AM    HDL Cholesterol 70 03/26/2020 03:27 AM LDL, calculated 59.4 03/26/2020 03:27 AM    VLDL, calculated 11.6 03/26/2020 03:27 AM    Triglyceride 58 03/26/2020 03:27 AM    CHOL/HDL Ratio 2.0 03/26/2020 03:27 AM     Patient is in critical condition and is at risk for sudden deterioration. 30 minutes spent on floor examining patient and reviewing chart.

## 2020-03-26 NOTE — PROGRESS NOTES
* No surgery found *  * No surgery found *  Bedside shift change report given to Glenny (oncoming nurse) by Nikita (offgoing nurse). Report included the following information SBAR, Kardex, ED Summary and Intake/Output. Zone Phone:   [phone]    Significant changes during shift:    Patient c/w slurred speech. BS 78 this morning, gave orange juice. Patient Romayne Bonus  70 y.o.  3/25/2020  7:10 AM by Palak Suarez MD. Jolie Shea was admitted from Home    Problem List    Patient Active Problem List    Diagnosis Date Noted    Aphasia 03/25/2020    Bilateral carotid artery stenosis 03/25/2020     Past Medical History:   Diagnosis Date    Diabetes ([facility] [address]     Hypertension      Core Measures:    CVA: Yes Yes  CHF:No No  PNA:No No    Activity Status:    OOB to Chair Yes  Ambulated this shift Yes   Bed Rest No    LINES AND DRAINS:    PIV    DVT prophylaxis:    DVT prophylaxis Med- Yes  DVT prophylaxis SCD or SYLVIA- No     Wounds: (If Applicable)    Wounds- No    Patient Safety:    Falls Score Total Score: 4  Safety Level_______  Bed Alarm On? Yes  Sitter?  No    Plan for upcoming shift:   safety and discharge planning,   EEG    Discharge Plan:TBD    Active Consults:  IP CONSULT TO HOSPITALIST  IP CONSULT TO NEUROLOGY

## 2020-03-26 NOTE — PROGRESS NOTES
Problem: Dysphagia (Adult)  Goal: *Acute Goals and Plan of Care (Insert Text)  Description: Speech pathology goals initiated 3/25/2020   1. Patient will tolerate a dental soft diet/ thin liquids free of s/s aspiration within 7 days  DISCONTINUED  2. Added 3/26/2020 patient will participate in [facility] for objective assessment  MET 3/26/2020      3/26/2020 1243 by Billie Connell, SLP  Outcome: Not Progressing Towards Goal   SPEECH [address] TREATMENT  Patient: Jackie Worrell (85 y.o. female)  Date: 3/26/2020  Diagnosis: Aphasia [R47.01]   <principal problem not specified>       Precautions: aspiration Fall    ASSESSMENT:  Patient with significant decline in comparison to yesterday. Patient with mod-severe oral and suspected moderate pharyngeal dysphagia. Oral dysphagia characterized by drooling, pooling of oral secretions, anterior spillage from L side with liquids, inability to sufficiently extract liquid via straw, piecemeal swallowing and suspect premature spillage. Pharyngeal dysphagia characterized by suspected delayed swallow initiation, reduced hyolaryngeal elevation/excursion via palpation, and double swallows which could indicate pharyngeal residue. Patient with coughing inconsistently after thins. No overt s/s aspiration after nectar thick liquids. Patient also with worsened dysarthria, now considered severe. Poor intelligibility even at the single word level and patient poorly stimulable for strategies. Spoke with RN and neurologist about change. MBS ordered along with head CT. PLAN:  Recommendations and Planned Interventions:  -- NPO until mbs completed this am.   - results/recommendations to follow    Patient continues to benefit from skilled intervention to address the above impairments. Continue treatment per established plan of care. Discharge Recommendations:  Rehab     SUBJECTIVE:   Patient alert, confused.     OBJECTIVE:   Cognitive and Communication Status:  Neurologic State: Alert  Orientation Level: Oriented to person, Oriented to place, Oriented to situation, Disoriented to time(very hard to understand)  Cognition: Follows commands  Perception: Cues to maintain midline in sitting, Tactile, Verbal, Visual, Cues to attend to left side of body, Cues to attend left visual field  Perseveration: No perseveration noted  Safety/Judgement: Fall prevention  Dysphagia Treatment:  Oral Assessment:  Oral Assessment  Labial: Left droop; Decreased rate;Decreased seal  Dentition: Edentulous  Oral Hygiene: (clean, oral secretions)  Lingual: Decreased rate  Velum: Unable to visualize  Mandible: No impairment  P.O. Trials:  Patient Position: (up in bed)  Vocal quality prior to P.O.: (hypernasal)  Consistency Presented: Thin liquid; Nectar thick liquid;Puree  How Presented: Self-fed/presented;SLP-fed/presented;Cup/sip;Spoon;Straw     Bolus Acceptance: Impaired  Bolus Formation/Control: Impaired  Type of Impairment: Anterior;Spillage;Drooling; Incomplete;Lip closure;Premature spillage  Propulsion: Delayed (# of seconds)  Oral Residue: 10-50% of bolus  Initiation of Swallow: Delayed (# of seconds)  Laryngeal Elevation: Decreased  Aspiration Signs/Symptoms: Strong cough(with thins)  Pharyngeal Phase Characteristics: Double swallow; Suspected pharyngeal residue  Effective Modifications: Small sips and bites  Cues for Modifications: Moderate       Oral Phase Severity: Moderate-severe  Pharyngeal Phase Severity : Moderate          Pain:  Pain Scale 1: Numeric (0 - 10)  Pain Intensity 1: 0       After treatment:   Patient left in no apparent distress in bed, Call bell within reach, Nursing notified, and Bed / chair alarm activated    COMMUNICATION/EDUCATION:   Patient was educated regarding her deficit(s) of dysphagia, dysarthria as this relates to her diagnosis of CVA.   She demonstrated Guarded understanding as evidenced by poor repeat back    The patient's plan of care including recommendations, planned interventions, and recommended diet changes were discussed with: Registered nurse, neurologist  Jeff Montenegro M.S. CCC-SLP  Time Calculation: 12 mins

## 2020-03-26 NOTE — PROGRESS NOTES
Bedside shift change report given to                  (oncoming nurse) by Zainab Velarde  (offgoing nurse). Report included the following information SBAR, Kardex, ED Summary and Intake/Output. 
  
[id number] 
  
Significant changes during shift:  none 
  
Patient Information 
  
Katia Garza 
70 y.o. 
3/25/2020  7:10 AM by Kamlesh Guardado MD. Katia Garza was admitted from Home 
  
Problem List 
  
    
Patient Active Problem List  
  Diagnosis Date Noted  Aphasia 03/25/2020  Bilateral carotid artery stenosis 03/25/2020  
  
    
Past Medical History:  
Diagnosis Date  Diabetes ([facility] [address]    
 Hypertension    
  
Core Measures: 
  
CVA: Yes Yes CHF:No No 
PNA:No No 
  
Activity Status: 
  
OOB to Chair Yes Ambulated this shift Yes Bed Rest No 
  
LINES AND DRAINS: 
  
PIV 
  
DVT prophylaxis: 
  
DVT prophylaxis Med- Yes DVT prophylaxis SCD or SYLVIA- No  
  
Wounds: (If Applicable) 
  
Wounds- No 
  
Patient Safety: 
  
Falls Score Total Score: 4 Safety Level_______ Bed Alarm On? Yes Sitter? No 
  
Plan for upcoming shift: safety and discharge planning 
  
Discharge Plan:SNF 
  
Active Consults: 
IP CONSULT TO HOSPITALIST 
IP CONSULT TO NEUROLOGY 
   
  
Revision History

## 2020-03-26 NOTE — PROGRESS NOTES
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     - Subtle asymmetry of density in the left M2 segment. CTA is pending.      - Imaging findings consistent with severe chronic microvascular ischemic change. - There is a mild degree of cerebral atrophy.  - CTA head and neck with contrast 3/25/2020      - No flow limiting stenosis or intracranial aneurysm.      - No major vessel occlusion.      - Severe chronic microvascular ischemic change with small/moderate remote         infarction in the left frontal lobe. - Thyroid hypodensities. Nonemergent outpatient thyroid ultrasound when clinically        feasible is suggested. - US of thyroid/Parathyroid     - multiple thyroid nodules  Acute Mental status change from baseline 3/26/2020  - increased weakness and worsening dysarthria  - Repeat CT head w/o contrast  - No acute changes from 3/25/2020  -Chest X-ray    - no acute findings  - MRI brain w/o contrast     - Small acute infarction posterior right corona radiata. Areas of chronic  infarction and confluent white matter disease as above. - Carotid Duplex bilateral  · There is mild stenosis in the right ICA (<50%). · There is mild stenosis in the left ICA (<50%). · The right vertebral is antegrade. · The left vertebral is antegrade.  - MBS today - follow-up  Echo 3/25/2020    -  Normal cavity size, wall thickness and systolic function (ejection fraction normal). Estimated left ventricular ejection fraction is 55 - 60%. -  Mild (grade 1) left ventricular diastolic dysfunction.    -  Mild pulmonary hypertension.    -  No atrial septal defect present.    -  No patent foramen ovale visualized.   - Troponin <0.05  - Neuro following  - Neuro checks   - ASA every day   - ST/PT/OT following    Hypertension - history of  - continue Cozaar/Metoprolol/Cardizem - home dose  - permissive BP per neuro    Diabetes - Type II - history of   - Hgb A1c 5.6   - SSI coverage    - accuchecks AC & HS   - hypoglycemia protocol    Hyperlipidemia - history of  - continue Lipitor 40 mg QHS             25.0 - 29.9 Overweight / Body mass index is 28.63 kg/m². Code status: Full  Prophylaxis: Lovenox  Recommended Disposition: to be determined - home with [facility] or [facility]     Subjective:     Chief Complaint / Reason for Physician Visit  Slurred speech and left facial droop  Unable to understand   Discussed with RN events overnight. Review of Systems: unable to understand   Symptom Y/N Comments  Symptom Y/N Comments   Fever/Chills    Chest Pain     Poor Appetite    Edema     Cough    Abdominal Pain     Sputum    Joint Pain     SOB/HOWARD    Pruritis/Rash     Nausea/vomit    Tolerating PT/OT     Diarrhea    Tolerating Diet     Constipation    Other       Could NOT obtain due to: severe dysarthria     Objective:     VITALS:   Last 24hrs VS reviewed since prior progress note. Most recent are:  Patient Vitals for the past 24 hrs:   Temp Pulse Resp BP SpO2   03/26/20 1155 98.2 °F (36.8 °C) 69 20 135/84 97 %   03/26/20 0728 97.7 °F (36.5 °C) 89 20 134/67 96 %   03/26/20 0400  88      03/26/20 0305 98.5 °F (36.9 °C) 77 18 153/70 97 %   03/26/20 0000  74      03/25/20 2358 98.7 °F (37.1 °C) 74 18 146/76 95 %   03/25/20 1954 98.4 °F (36.9 °C) 66 20 151/60 97 %   03/25/20 1527 98.3 °F (36.8 °C) 94 20 150/86 99 %       Intake/Output Summary (Last 24 hours) at 3/26/2020 1322  Last data filed at 3/26/2020 2265  Gross per 24 hour   Intake    Output 350 ml   Net -350 ml        PHYSICAL EXAM:  General: Awake, no acute distress    EENT:  Able to track with eyes. Anicteric sclerae. drooling  Resp:  CTA bilaterally, no wheezing or rales.   No accessory muscle use  CV:  Regular  rhythm,  No edema  GI:  Soft, Non distended, Non tender.  +Bowel sounds  Neurologic:  Alert and oriented to name and place, incomprehensible speech, weak  left 1/5; R 3/5 to upper extremities  Psych:   Not anxious nor agitated  Skin:  No rashes. No jaundice    Reviewed most current lab test results and cultures  YES  Reviewed most current radiology test results   YES  Review and summation of old records today    NO  Reviewed patient's current orders and MAR    YES  PMH/SH reviewed - no change compared to H&P  ________________________________________________________________________  Care Plan discussed with:    Comments   Patient     Family      RN X    Care Manager     Consultant                        Multidiciplinary team rounds were held today with , nursing, pharmacist and clinical coordinator. Patient's plan of care was discussed; medications were reviewed and discharge planning was addressed. ________________________________________________________________________  Total NON critical care TIME: 55  Minutes    Total CRITICAL CARE TIME Spent:   Minutes non procedure based      Comments   >50% of visit spent in counseling and coordination of care     ________________________________________________________________________  Jolie Schultz     Procedures: see electronic medical records for all procedures/Xrays and details which were not copied into this note but were reviewed prior to creation of Plan. LABS:  I reviewed today's most current labs and imaging studies.   Pertinent labs include:  Recent Labs     03/25/20  0720   WBC 6.9   HGB 13.9   HCT 43.2        Recent Labs     03/25/20  0720      K 4.3   *   CO2 26   *   BUN 13   CREA 0.81   CA 8.9   ALB 3.6   TBILI 0.4   SGOT 17   ALT 21   INR 1.0       Signed: Jolie Schultz

## 2020-03-26 NOTE — PROGRESS NOTES
Physical Therapy    Chart reviewed and consulted with SLP, patient going off the floor for MBS. Will defer and continue to follow.     Radha Cardona

## 2020-03-27 LAB
ALBUMIN SERPL-MCNC: 3.2 G/DL (ref 3.5–5)
ALBUMIN/GLOB SERPL: 0.8 {RATIO} (ref 1.1–2.2)
ALP SERPL-CCNC: 104 U/L (ref 45–117)
ALT SERPL-CCNC: 19 U/L (ref 12–78)
ANION GAP SERPL CALC-SCNC: 4 MMOL/L (ref 5–15)
AST SERPL-CCNC: 10 U/L (ref 15–37)
ATRIAL RATE: 89 BPM
BILIRUB SERPL-MCNC: 0.5 MG/DL (ref 0.2–1)
BUN SERPL-MCNC: 16 MG/DL (ref 6–20)
BUN/CREAT SERPL: 22 (ref 12–20)
CALCIUM SERPL-MCNC: 8.8 MG/DL (ref 8.5–10.1)
CALCULATED P AXIS, ECG09: 47 DEGREES
CALCULATED R AXIS, ECG10: 1 DEGREES
CALCULATED T AXIS, ECG11: -8 DEGREES
CHLORIDE SERPL-SCNC: 114 MMOL/L (ref 97–108)
CO2 SERPL-SCNC: 24 MMOL/L (ref 21–32)
CREAT SERPL-MCNC: 0.74 MG/DL (ref 0.55–1.02)
DIAGNOSIS, 93000: NORMAL
ERYTHROCYTE [DISTWIDTH] IN BLOOD BY AUTOMATED COUNT: 15.9 % (ref 11.5–14.5)
GLOBULIN SER CALC-MCNC: 4 G/DL (ref 2–4)
GLUCOSE BLD STRIP.AUTO-MCNC: 105 MG/DL (ref 65–100)
GLUCOSE BLD STRIP.AUTO-MCNC: 108 MG/DL (ref 65–100)
GLUCOSE BLD STRIP.AUTO-MCNC: 122 MG/DL (ref 65–100)
GLUCOSE BLD STRIP.AUTO-MCNC: 138 MG/DL (ref 65–100)
GLUCOSE SERPL-MCNC: 125 MG/DL (ref 65–100)
HCT VFR BLD AUTO: 43 % (ref 35–47)
HGB BLD-MCNC: 13.8 G/DL (ref 11.5–16)
MCH RBC QN AUTO: 29.9 PG (ref 26–34)
MCHC RBC AUTO-ENTMCNC: 32.1 G/DL (ref 30–36.5)
MCV RBC AUTO: 93.3 FL (ref 80–99)
NRBC # BLD: 0 K/UL (ref 0–0.01)
NRBC BLD-RTO: 0 PER 100 WBC
P-R INTERVAL, ECG05: 140 MS
PLATELET # BLD AUTO: 243 K/UL (ref 150–400)
PMV BLD AUTO: 10.8 FL (ref 8.9–12.9)
POTASSIUM SERPL-SCNC: 3.6 MMOL/L (ref 3.5–5.1)
PROT SERPL-MCNC: 7.2 G/DL (ref 6.4–8.2)
Q-T INTERVAL, ECG07: 332 MS
QRS DURATION, ECG06: 80 MS
QTC CALCULATION (BEZET), ECG08: 403 MS
RBC # BLD AUTO: 4.61 M/UL (ref 3.8–5.2)
SERVICE CMNT-IMP: ABNORMAL
SODIUM SERPL-SCNC: 142 MMOL/L (ref 136–145)
VENTRICULAR RATE, ECG03: 89 BPM
WBC # BLD AUTO: 13.2 K/UL (ref 3.6–11)

## 2020-03-27 PROCEDURE — 74011000250 HC RX REV CODE- 250: Performed by: FAMILY MEDICINE

## 2020-03-27 PROCEDURE — 74011250637 HC RX REV CODE- 250/637: Performed by: NURSE PRACTITIONER

## 2020-03-27 PROCEDURE — 74011250636 HC RX REV CODE- 250/636: Performed by: HOSPITALIST

## 2020-03-27 PROCEDURE — 74011250637 HC RX REV CODE- 250/637: Performed by: PSYCHIATRY & NEUROLOGY

## 2020-03-27 PROCEDURE — 92526 ORAL FUNCTION THERAPY: CPT

## 2020-03-27 PROCEDURE — 92507 TX SP LANG VOICE COMM INDIV: CPT

## 2020-03-27 PROCEDURE — 97116 GAIT TRAINING THERAPY: CPT

## 2020-03-27 PROCEDURE — 97112 NEUROMUSCULAR REEDUCATION: CPT

## 2020-03-27 PROCEDURE — 65660000000 HC RM CCU STEPDOWN

## 2020-03-27 PROCEDURE — 97112 NEUROMUSCULAR REEDUCATION: CPT | Performed by: OCCUPATIONAL THERAPIST

## 2020-03-27 PROCEDURE — 36415 COLL VENOUS BLD VENIPUNCTURE: CPT

## 2020-03-27 PROCEDURE — 85027 COMPLETE CBC AUTOMATED: CPT

## 2020-03-27 PROCEDURE — 74011250637 HC RX REV CODE- 250/637: Performed by: HOSPITALIST

## 2020-03-27 PROCEDURE — 93005 ELECTROCARDIOGRAM TRACING: CPT

## 2020-03-27 PROCEDURE — 82962 GLUCOSE BLOOD TEST: CPT

## 2020-03-27 PROCEDURE — 97535 SELF CARE MNGMENT TRAINING: CPT | Performed by: OCCUPATIONAL THERAPIST

## 2020-03-27 PROCEDURE — 97530 THERAPEUTIC ACTIVITIES: CPT | Performed by: OCCUPATIONAL THERAPIST

## 2020-03-27 PROCEDURE — 80053 COMPREHEN METABOLIC PANEL: CPT

## 2020-03-27 PROCEDURE — 94760 N-INVAS EAR/PLS OXIMETRY 1: CPT

## 2020-03-27 RX ORDER — DILTIAZEM HYDROCHLORIDE 30 MG/1
60 TABLET, FILM COATED ORAL
Status: DISCONTINUED | OUTPATIENT
Start: 2020-03-27 | End: 2020-03-29 | Stop reason: HOSPADM

## 2020-03-27 RX ADMIN — ATORVASTATIN CALCIUM 40 MG: 40 TABLET, FILM COATED ORAL at 20:28

## 2020-03-27 RX ADMIN — ASPIRIN 325 MG ORAL TABLET 325 MG: 325 PILL ORAL at 08:40

## 2020-03-27 RX ADMIN — DILTIAZEM HYDROCHLORIDE 60 MG: 30 TABLET, FILM COATED ORAL at 17:10

## 2020-03-27 RX ADMIN — LOSARTAN POTASSIUM 100 MG: 50 TABLET, FILM COATED ORAL at 08:40

## 2020-03-27 RX ADMIN — METOPROLOL TARTRATE 50 MG: 50 TABLET, FILM COATED ORAL at 08:40

## 2020-03-27 RX ADMIN — Medication 10 ML: at 13:00

## 2020-03-27 RX ADMIN — Medication 10 ML: at 20:29

## 2020-03-27 RX ADMIN — METOPROLOL TARTRATE 2.5 MG: 5 INJECTION INTRAVENOUS at 00:05

## 2020-03-27 RX ADMIN — DILTIAZEM HYDROCHLORIDE 60 MG: 30 TABLET, FILM COATED ORAL at 12:59

## 2020-03-27 RX ADMIN — ENOXAPARIN SODIUM 40 MG: 40 INJECTION SUBCUTANEOUS at 17:11

## 2020-03-27 RX ADMIN — Medication 10 ML: at 02:31

## 2020-03-27 RX ADMIN — DILTIAZEM HYDROCHLORIDE 60 MG: 30 TABLET, FILM COATED ORAL at 09:45

## 2020-03-27 RX ADMIN — DILTIAZEM HYDROCHLORIDE 60 MG: 30 TABLET, FILM COATED ORAL at 20:28

## 2020-03-27 RX ADMIN — METOPROLOL TARTRATE 50 MG: 50 TABLET, FILM COATED ORAL at 17:10

## 2020-03-27 NOTE — PROGRESS NOTES
Problem: Falls - Risk of  Goal: *Absence of Falls  Description: Document Branden Quarles Fall Risk and appropriate interventions in the flowsheet. Outcome: Progressing Towards Goal  Note: Fall Risk Interventions:  Mobility Interventions: Bed/chair exit alarm, OT consult for ADLs, Patient to call before getting OOB, PT Consult for mobility concerns, PT Consult for assist device competence    Mentation Interventions: Bed/chair exit alarm, Door open when patient unattended, Adequate sleep, hydration, pain control, Increase mobility, More frequent rounding, Reorient patient, Toileting rounds, Update white board    Medication Interventions: Assess postural VS orthostatic hypotension, Bed/chair exit alarm, Evaluate medications/consider consulting pharmacy, Patient to call before getting OOB, Teach patient to arise slowly, Utilize gait belt for transfers/ambulation    Elimination Interventions: Bed/chair exit alarm, Call light in reach, Patient to call for help with toileting needs, Stay With Me (per policy), Toilet paper/wipes in reach, Toileting schedule/hourly rounds    History of Falls Interventions: Bed/chair exit alarm         Problem: Patient Education: Go to Patient Education Activity  Goal: Patient/Family Education  Outcome: Progressing Towards Goal     Problem: Patient Education: Go to Patient Education Activity  Goal: Patient/Family Education  Outcome: Progressing Towards Goal     Problem: Patient Education: Go to Patient Education Activity  Goal: Patient/Family Education  Outcome: Progressing Towards Goal     Problem: Pressure Injury - Risk of  Goal: *Prevention of pressure injury  Description: Document Salvatore Scale and appropriate interventions in the flowsheet.   Outcome: Progressing Towards Goal  Note: Pressure Injury Interventions:  Sensory Interventions: Assess changes in LOC, Assess need for specialty bed, Avoid rigorous massage over bony prominences, Chair cushion, Check visual cues for pain, Discuss PT/OT consult with provider, Float heels, Keep linens dry and wrinkle-free, Maintain/enhance activity level, Minimize linen layers, Turn and reposition approx. every two hours (pillows and wedges if needed)    Moisture Interventions: Absorbent underpads, Apply protective barrier, creams and emollients, Check for incontinence Q2 hours and as needed, Assess need for specialty bed, Maintain skin hydration (lotion/cream), Internal/External urinary devices, Limit adult briefs, Minimize layers, Moisture barrier    Activity Interventions: Assess need for specialty bed, Chair cushion, Increase time out of bed, PT/OT evaluation    Mobility Interventions: Assess need for specialty bed, Chair cushion, Float heels, HOB 30 degrees or less, PT/OT evaluation, Turn and reposition approx.  every two hours(pillow and wedges)    Nutrition Interventions: Document food/fluid/supplement intake    Friction and Shear Interventions: Apply protective barrier, creams and emollients, Feet elevated on foot rest, Foam dressings/transparent film/skin sealants, HOB 30 degrees or less, Lift sheet, Lift team/patient mobility team, Minimize layers                Problem: Patient Education: Go to Patient Education Activity  Goal: Patient/Family Education  Outcome: Progressing Towards Goal     Problem: Patient Education: Go to Patient Education Activity  Goal: Patient/Family Education  Outcome: Progressing Towards Goal     Problem: TIA/CVA Stroke: 0-24 hours  Goal: Off Pathway (Use only if patient is Off Pathway)  Outcome: Progressing Towards Goal  Goal: Activity/Safety  Outcome: Progressing Towards Goal  Goal: Consults, if ordered  Outcome: Progressing Towards Goal  Goal: Diagnostic Test/Procedures  Outcome: Progressing Towards Goal  Goal: Nutrition/Diet  Outcome: Progressing Towards Goal  Goal: Discharge Planning  Outcome: Progressing Towards Goal  Goal: Medications  Outcome: Progressing Towards Goal  Goal: Respiratory  Outcome: Progressing Towards Goal  Goal: Treatments/Interventions/Procedures  Outcome: Progressing Towards Goal  Goal: Minimize risk of bleeding post-thrombolytic infusion  Outcome: Progressing Towards Goal  Goal: Monitor for complications post-thrombolytic infusion  Outcome: Progressing Towards Goal  Goal: Psychosocial  Outcome: Progressing Towards Goal  Goal: *Hemodynamically stable  Outcome: Progressing Towards Goal  Goal: *Neurologically stable  Description: Absence of additional neurological deficits    Outcome: Progressing Towards Goal  Goal: *Verbalizes anxiety and depression are reduced or absent  Outcome: Progressing Towards Goal  Goal: *Absence of Signs of Aspiration on Current Diet  Outcome: Progressing Towards Goal  Goal: *Absence of deep venous thrombosis signs and symptoms(Stroke Metric)  Outcome: Progressing Towards Goal  Goal: *Ability to perform ADLs and demonstrates progressive mobility and function  Outcome: Progressing Towards Goal  Goal: *Stroke education started(Stroke Metric)  Outcome: Progressing Towards Goal  Goal: *Dysphagia screen performed(Stroke Metric)  Outcome: Progressing Towards Goal  Goal: *Rehab consulted(Stroke Metric)  Outcome: Progressing Towards Goal     Problem: TIA/CVA Stroke: Day 2 Until Discharge  Goal: Off Pathway (Use only if patient is Off Pathway)  Outcome: Progressing Towards Goal  Goal: Activity/Safety  Outcome: Progressing Towards Goal  Goal: Diagnostic Test/Procedures  Outcome: Progressing Towards Goal  Goal: Nutrition/Diet  Outcome: Progressing Towards Goal  Goal: Discharge Planning  Outcome: Progressing Towards Goal  Goal: Medications  Outcome: Progressing Towards Goal  Goal: Respiratory  Outcome: Progressing Towards Goal  Goal: Treatments/Interventions/Procedures  Outcome: Progressing Towards Goal  Goal: Psychosocial  Outcome: Progressing Towards Goal  Goal: *Verbalizes anxiety and depression are reduced or absent  Outcome: Progressing Towards Goal  Goal: *Absence of aspiration  Outcome: Progressing Towards Goal  Goal: *Absence of deep venous thrombosis signs and symptoms(Stroke Metric)  Outcome: Progressing Towards Goal  Goal: *Optimal pain control at patient's stated goal  Outcome: Progressing Towards Goal  Goal: *Tolerating diet  Outcome: Progressing Towards Goal  Goal: *Ability to perform ADLs and demonstrates progressive mobility and function  Outcome: Progressing Towards Goal  Goal: *Stroke education continued(Stroke Metric)  Outcome: Progressing Towards Goal     Problem: Ischemic Stroke: Discharge Outcomes  Goal: *Verbalizes anxiety and depression are reduced or absent  Outcome: Progressing Towards Goal  Goal: *Verbalize understanding of risk factor modification(Stroke Metric)  Outcome: Progressing Towards Goal  Goal: *Hemodynamically stable  Outcome: Progressing Towards Goal  Goal: *Absence of aspiration pneumonia  Outcome: Progressing Towards Goal  Goal: *Aware of needed dietary changes  Outcome: Progressing Towards Goal  Goal: *Verbalize understanding of prescribed medications including anti-coagulants, anti-lipid, and/or anti-platelets(Stroke Metric)  Outcome: Progressing Towards Goal  Goal: *Tolerating diet  Outcome: Progressing Towards Goal  Goal: *Aware of follow-up diagnostics related to anticoagulants  Outcome: Progressing Towards Goal  Goal: *Ability to perform ADLs and demonstrates progressive mobility and function  Outcome: Progressing Towards Goal  Goal: *Absence of DVT(Stroke Metric)  Outcome: Progressing Towards Goal  Goal: *Absence of aspiration  Outcome: Progressing Towards Goal  Goal: *Optimal pain control at patient's stated goal  Outcome: Progressing Towards Goal  Goal: *Home safety concerns addressed  Outcome: Progressing Towards Goal  Goal: *Describes available resources and support systems  Outcome: Progressing Towards Goal  Goal: *Verbalizes understanding of activation of EMS(911) for stroke symptoms(Stroke Metric)  Outcome: Progressing Towards Goal  Goal: *Understands and describes signs and symptoms to report to providers(Stroke Metric)  Outcome: Progressing Towards Goal  Goal: *Neurolgocially stable (absence of additional neurological deficits)  Outcome: Progressing Towards Goal  Goal: *Verbalizes importance of follow-up with primary care physician(Stroke Metric)  Outcome: Progressing Towards Goal  Goal: *Smoking cessation discussed,if applicable(Stroke Metric)  Outcome: Progressing Towards Goal  Goal: *Depression screening completed(Stroke Metric)  Outcome: Progressing Towards Goal     Problem: Patient Education: Go to Patient Education Activity  Goal: Patient/Family Education  Outcome: Progressing Towards Goal

## 2020-03-27 NOTE — PROGRESS NOTES
Problem: Self Care Deficits Care Plan (Adult)  Goal: *Acute Goals and Plan of Care (Insert Text)  Description: FUNCTIONAL STATUS PRIOR TO ADMISSION: ambulated without assist devices but reported that she needed a cane (does not have one), sits down in the bottom of the tub to bathe and gets back up on her own, home alone during the day while her daughter works, performed ADLs on her own, had some slurred speech at [facility]: The patient lived with daughter but did not require assist.    Occupational Therapy Goals:  Initiated 3/25/2020  1. Patient will perform grooming standing without cues for location of object on the left with supervision within 7 days. 2. Patient will perform toileting with supervision within 7 days. 3. Patient will perform dressing with supervision within 7 days. 4. Patient will transfer from toilet with supervision using the least restrictive device and appropriate durable medical equipment within 7 days. 5. Patient will improve fugl ingram score by 6 points within 7 days. 3/27/2020 1653 by Inés Murphy OTR/L  Outcome: Progressing Towards Goal  3/27/2020 1144 by Inés Murphy OTR/L  Outcome: Progressing Towards Goal   OCCUPATIONAL THERAPY TREATMENT  Patient: Katia Garza (22 y.o. female)  Date: 3/27/2020  Diagnosis: Aphasia [R47.01]   <principal problem not specified>       Precautions: Fall  Chart, occupational therapy assessment, plan of care, and goals were reviewed. ASSESSMENT  Patient continues with skilled OT services and is progressing towards goals. However, pt continues to demonstrate decreased functional ability in dominant LUE in comparison to initial evaluation. Pt scored 32/66 on the Fugl ingram test. (on 3/25 pt scored 60/66). L UE/hand exercises were performed using foam resistance sponge and green theraputty. Pt educated on using, and will benefit from participating at least 2 times a day.  Written instructions provided and pt verbalized understanding. Current Level of Function Impacting Discharge (ADLs): maximal assistance for self care    Other factors to consider for discharge: independent prior to admission         PLAN :  Patient continues to benefit from skilled intervention to address the above impairments. Continue treatment per established plan of care. to address goals. Recommend with staff: upright and OOB to chair at least for meals    Recommend next OT session: f/u on UE exercise program, adl mobility    Recommendation for discharge: (in order for the patient to meet his/her long term goals)  Therapy 3 hours per day 5-7 days per week    This discharge recommendation:  Has not yet been discussed the attending provider and/or case management    IF patient discharges home will need the following DME: tbd       SUBJECTIVE:   Patient stated I do this every day?.  (very difficult to understand due to dysarthria)    OBJECTIVE DATA SUMMARY:   Cognitive/Behavioral Status:  Neurologic State: Alert  Orientation Level: Oriented to person;Oriented to place(hard to understand)  Cognition: Follows commands  Perception: Cues to attend left visual field;Cues to attend to left side of body  Perseveration: No perseveration noted; Tactile cues provided;Verbal cues provided;Visual cues provided        Balance:  Sitting: Intact--pt seated upright supported at bed level       Cognitive Retraining  Attention to Task: Distractibility  Following Commands:  Follows one step commands/directions  Cues: Tactile cues provided;Verbal cues provided;Visual cues provided    Therapeutic Exercises:   Self AAROM BUE and LUE AA  ROM  Blue foam exercise block for gross grasp and release and thumb  Green Theraputty- rolling into ball snake, pinching the snake and flattening a ball into a pancake to incorporate LUE sensory awareness, repetition and intensity to facilitate neuromotor recovery    [facility] post stroke readministered this date and found to be signficantly below original FM testing on 3/25. Fugl-Duran Assessment of Motor Recovery after Stroke:     Reflex Activity  Flexors/Biceps/Fingers: Can be elicited  Extensors/Triceps: Can be elicited  Reflex Subtotal: 4    Volitional Movement Within Synergies  Shoulder Retraction: Partial  Shoulder Elevation: Partial  Shoulder Abduction (90 degrees): Partial  Shoulder External Rotation: Partial  Elbow Flexion: Full  Forearm Supination: Partial  Shoulder Adduction/Internal Rotation: Partial  Elbow Extension: Full  Forearm Pronation: Full  Subtotal: 12    Volitional Movement Mixing Synergies  Hand to Lumbar Spine: Partial  Shoulder Flexion (0-90 degrees): Partial  Pronation-Supination: Partial  Subtotal: 3    Volitional Movement With Little or No Synergy  Shoulder Abduction (0-90 degrees): Partial  Shoulder Flexion ( degrees): None  Pronation/Supination: Partial  Subtotal : 2    Normal Reflex Activity  Biceps, Triceps, Finger Flexors: Full  Subtotal : 2    Upper Extremity Total   Upper Extremity Total: 23    Wrist  Stability at 15 Degree Dorsiflexion: Partial  Repeated Dorsiflexion/ Volar Flexion: None  Stability at 15 Degree Dorsiflexion: Partial  Repeated Dorsiflexion/ Volar Flexion: None  Circumduction: None  Wrist Total: 2    Hand  Mass Flexion: Partial  Mass Extension: Partial  Grasp A: None  Grasp B: Partial  Grasp C: None  Grasp D: Partial  Grasp E: Partial  Hand Total: 5    Coordination/Speed  Tremor: Slight  Dysmetria: Slight  Time: >5s  Coordination/Speed Total : 2    Total A-D  Total A-D (Motor Function): 32/66     Demonstrates decrease in LUE function since initial evaluation (60/66). This is a reliable/valid measure of arm function after a neurological event. It has established value to characterize functional status and for measuring spontaneous and therapy-induced recovery; tests proximal and distal motor functions.  Fugl-Duran Assessment - UE scores recorded between five and 30 days post neurologic event can be used to predict UE recovery at six months post neurologic event. Severe = 0-21 points   Moderately Severe = 22-33 points   Moderate = 34-47 points   Mild = 48-66 points  TANA Dawkins, MILA Delgado, & NAMITA Lopez (1992). Measurement of motor recovery after stroke: Outcome assessment and sample size requirements. Stroke, 23, pp. 1403-0398.   --------------------------------------------------------------------------------------------------------------------------------------------------------------------  MCID:  Stroke:   Jessica Hernandez et al, 2001; n = 171; mean age 79 (5) years; assessed within 16 (12) days of stroke, Acute Stroke)  FMA Motor Scores from Admission to Discharge   10 point increase in FMA Upper Extremity = 1.5 change in discharge FIM   10 point increase in FMA Lower Extremity = 1.9 change in discharge FIM  MDC:   Stroke:   Faheem Lim et al, 2008, n = 14, mean age = 59.9 (14.6) years, assessed on average 14 (6.5) months post stroke, Chronic Stroke)   FMA = 5.2 points for the Upper Extremity portion of the assessment      Pain:  No complaints. Activity Tolerance:   Good    After treatment patient left in no apparent distress:   Upright in bed, family member (staff) assisting with setting up pt for her meal.       COMMUNICATION/COLLABORATION:   The patients plan of care was discussed with: Registered nurse.      ERNESTINA Ortiz/L  Time Calculation: 21 mins

## 2020-03-27 NOTE — PROGRESS NOTES
Chief Complaint:  stroke    Patient sitting up in Chair. Very dysarthric. Diet switched to puree and nectar thick. Assesment and Plan  1. Stroke  Repeat CT  No bleed  Continue plavix  Will need intensive inpatient rehabilitation  LDL 59.4  Carotid doppler:   · There is mild stenosis in the right ICA (<50%). · There is mild stenosis in the left ICA (<50%). A1C 5.6  Echocardiogram   ·  Normal cavity size, wall thickness and systolic function (ejection fraction normal). Estimated left ventricular ejection fraction is 55 - 60%. Mild (grade 1) left ventricular diastolic dysfunction. · Mild pulmonary hypertension. · No atrial septal defect present. · No patent foramen ovale visualized. 2.  Dysphagia  Much worse from admission    3. Left hemiapresis  Arm worsened since admission  Good strength in leg. 4.  Hypertension  Continue Cozaar    5. Hyperlipidemia  Continue atorvastatin    May be discharged to inpatient rehabilitation      Allergies  Patient has no known allergies.      Medications  Current Facility-Administered Medications   Medication Dose Route Frequency    dilTIAZem (CARDIZEM) IR tablet 60 mg  60 mg Oral AC&HS    aspirin tablet 325 mg  325 mg Oral DAILY    acetaminophen (TYLENOL) tablet 650 mg  650 mg Oral Q6H PRN    ondansetron (ZOFRAN) injection 4 mg  4 mg IntraVENous Q4H PRN    sodium chloride (NS) flush 5-40 mL  5-40 mL IntraVENous Q8H    sodium chloride (NS) flush 5-40 mL  5-40 mL IntraVENous PRN    enoxaparin (LOVENOX) injection 40 mg  40 mg SubCUTAneous Q24H    metoprolol tartrate (LOPRESSOR) tablet 50 mg  50 mg Oral BID    losartan (COZAAR) tablet 100 mg  100 mg Oral DAILY    atorvastatin (LIPITOR) tablet 40 mg  40 mg Oral QHS    insulin lispro (HUMALOG) injection   SubCUTAneous TIDAC    glucose chewable tablet 16 g  4 Tab Oral PRN    dextrose (D50W) injection syrg 12.5-25 g  12.5-25 g IntraVENous PRN    glucagon (GLUCAGEN) injection 1 mg  1 mg IntraMUSCular PRN    influenza vaccine 2019-20 (6 mos+)(PF) (FLUARIX/FLULAVAL/FLUZONE QUAD) injection 0.5 mL  0.5 mL IntraMUSCular PRIOR TO DISCHARGE    acetaminophen (TYLENOL) tablet 650 mg  650 mg Oral Q4H PRN    Or    acetaminophen (TYLENOL) solution 650 mg  650 mg Per NG tube Q4H PRN    Or    acetaminophen (TYLENOL) suppository 650 mg  650 mg Rectal Q4H PRN        Medical History  Past Medical History:   Diagnosis Date    Diabetes ([facility] [address]     Hypertension        Review of Systems   Reason unable to perform ROS: severe dysarthria. Exam:    Visit Vitals  /68   Pulse 68   Temp 98.4 °F (36.9 °C)   Resp 18   Ht 5' 2\" (1.575 m)   Wt 156 lb 8.4 oz (71 kg)   SpO2 100%   Breastfeeding No   BMI 28.63 kg/m²      General: Well developed, well nourished. Patient in no apparent distress   Head: Normocephalic, atraumatic, anicteric sclera   Neck Normal ROM, No thyromegally   Lungs:  Clear to auscultation bilaterally, No wheezes or rubs   Cardiac: Regular rate and rhythm with no murmurs. Abd: Bowel sounds were audible. No tenderness on palpation   Ext: No pedal edema   Skin: Supple no rash      NeurologicExam:  Mental Status:  Oriented to person place but not time appears confused, answers inconsistently   Speech:  Severe dysarthria   Cranial Nerves:  II - XII Intact subtle left facial droop, weak gag,    Motor:   LUE 3/5 weakness of , 3+ deltoid and biceps. LLE 4/5  Right side full strenght   Reflexes:   Deep tendon reflexes 2+/4 and symmetric. Sensory:    Sensory loss on the left   Gait:  Cant support weight    Tremor:   No tremor noted.        Lab Review  Lab Results   Component Value Date/Time    WBC 13.2 (H) 03/27/2020 12:28 AM    HCT 43.0 03/27/2020 12:28 AM    HGB 13.8 03/27/2020 12:28 AM    PLATELET 719 21/83/6811 12:28 AM       Lab Results   Component Value Date/Time    Sodium 142 03/27/2020 12:28 AM    Potassium 3.6 03/27/2020 12:28 AM    Chloride 114 (H) 03/27/2020 12:28 AM    CO2 24 03/27/2020 12:28 AM    Glucose 125 (H) 03/27/2020 12:28 AM    BUN 16 03/27/2020 12:28 AM    Creatinine 0.74 03/27/2020 12:28 AM    Calcium 8.8 03/27/2020 12:28 AM       Lab Results   Component Value Date/Time    Hemoglobin A1c 5.6 03/26/2020 03:27 AM        Lab Results   Component Value Date/Time    MERRILL by IFA,IgG <1:40 10/04/2009 02:15 AM       Lab Results   Component Value Date/Time    Cholesterol, total 141 03/26/2020 03:27 AM    HDL Cholesterol 70 03/26/2020 03:27 AM    LDL, calculated 59.4 03/26/2020 03:27 AM    VLDL, calculated 11.6 03/26/2020 03:27 AM    Triglyceride 58 03/26/2020 03:27 AM    CHOL/HDL Ratio 2.0 03/26/2020 03:27 AM

## 2020-03-27 NOTE — PROGRESS NOTES
Problem: Dysphagia (Adult)  Goal: *Acute Goals and Plan of Care (Insert Text)  Description: Speech pathology goals initiated 3/25/2020   1. Patient will tolerate a dental soft diet/ thin liquids free of s/s aspiration within 7 days  DISCONTINUED  2. Added 3/26/2020 patient will participate in [address] for objective assessment  MET 3/26/2020   3. Added 3/26/2020 patient will tolerate a puree diet/ nectar thick liquids free of s/s aspiration within 7 days       Outcome: Progressing Towards Goal     Problem: Motor Speech Impaired (Adult)  Goal: *Acute Goals and Plan of Care (Insert Text)  Description: Speech pathology goals initiated 3/25/2020   1. Patient will verbalize 3/3 compensatory speech strategies with mod cues  2. Patient will utilize compensatory speech strategies in order to achieve 100% intelligibility at the sentence level with mod cues within 7 days   Outcome: Progressing Towards Goal   SPEECH LANGUAGE PATHOLOGY DYSPHAGIA TREATMENT  Patient: Lee Prado (88 y.o. female)  Date: 3/27/2020  Diagnosis: Aphasia [R47.01]   <principal problem not specified>       Precautions:  Fall    ASSESSMENT:  Pt seen today for swallowing therapy. She is on a pureed and nectar thick diet. Oral phase is very slow and she has min to mild oral residue on the R side of her tongue. She is not aware of this and needs liquid wash to clear it. She coughed several times and suspect she is not swallowing her saliva well. Some drooling on the L noted. Overall tolerating her diet but not ready for an upgrade. Her motor speech is severely impaired. Her speech is imprecise, hypernasal and low volume. She could use cues to improve intelligibility with max cues and a model inconsistently. Poor insight into deficits and using strategies. Her childrens' names and several other single words were intelligible. Short phrases were practiced. Very minimal use of strategies noted.       PLAN:  Recommendations and Planned Interventions:  Recommend continuing with current diet of purees and nectar thick liquids  Needs intensive speech and swallowing therapy  Patient continues to benefit from skilled intervention to address the above impairments. Continue treatment per established plan of care. Discharge Recommendations: To Be Determined     SUBJECTIVE:   Patient stated 74 as her age. OBJECTIVE:   Cognitive and Communication Status:  Neurologic State: Alert  Orientation Level: Oriented to person, Oriented to place(hard to understand)  Cognition: Follows commands  Perception: Cues to maintain midline in sitting, Tactile, Verbal, Visual, Cues to attend to left side of body, Cues to attend left visual field  Perseveration: No perseveration noted  Safety/Judgement: Fall prevention  Dysphagia Treatment:  Oral Assessment:     P.O. Trials:  Patient Position: upright in bed  Vocal quality prior to P.O.: (hypernasal, wet occasionally)  Consistency Presented: Nectar thick liquid;Puree  How Presented: Self-fed/presented;Cup/sip     Bolus Acceptance: No impairment  Bolus Formation/Control: Impaired  Type of Impairment: Drooling; Anterior;Spillage  Propulsion: Delayed (# of seconds)  Oral Residue: Less than 10% of bolus  Initiation of Swallow: Delayed (# of seconds)  Laryngeal Elevation: Decreased  Aspiration Signs/Symptoms: Strong cough(intermittently)                Oral Phase Severity: Moderate-severe  Pharyngeal Phase Severity : Moderate       Pain:  Pain Scale 1: Numeric (0 - 10)  Pain Intensity 1: 0  Pain Location 1: Arm    After treatment:   Patient left in no apparent distress in bed    COMMUNICATION/EDUCATION:   Patient was educated regarding her deficit(s) of dysphagia and dysarthria  as this relates to her diagnosis of CVA  She demonstrated Fair understanding . The patient's plan of care including recommendations, planned interventions, and recommended diet changes were discussed with: Registered nurse.      Mabel Mcintosh, SLP  Time Calculation: 12 mins

## 2020-03-27 NOTE — PROGRESS NOTES
I reviewed pertinent labs and imaging, and discussed /agreed on the plan of care with Dr. Kaz Brown. Hospitalist Progress Note    NAME: Emmanuelle Avila   :  1940   MRN:  [id number]     Assessment / Plan:  CM following for discharge planning. Referral sent to [facility]; acceptance pending    Slurred speech secondary to   Acute ischemic CVA  · 2020 CT head:  No definite acute process identified. Subtle asymmetry of density in the left M2 segment. CTA is pending. Imaging findings consistent with severe chronic microvascular ischemic change. There is a mild degree of cerebral atrophy  · CTA head:  No flow limiting stenosis or intracranial aneurysm. No major vessel occlusion. Severe chronic microvascular ischemic change with small/moderate remote infarction in the left frontal lobe. Thyroid hypodensities. Nonemergent outpatient thyroid ultrasound when clinically feasible is suggested. · MRI brain:  Small acute infarction posterior right corona radiata. Areas of chronic infarction and confluent white matter disease   · Thyroid US:  Multiple thyroid nodules   · XR swallow:  Mild abnormality of the swallowing mechanism  · 2020 Repeat CT head:  No changes   · Appreciate neurology input   · Plavix once able to take PO; for now continue ASA suppository   · PT/OT  · ECHO with EF 55-60%  · SLP; recommend purees and nectar thin liquids     Paroxysmal atrial fibrillation  Hypercoagulability secondary to afib  HTN  HLD  · Patient and daughter deny hx of atrial fibrillation. However, patient is currently taking Cardizem and BB. Periods of afib. · Continue Cardizem and BB  · Continue statin, ASA, losartan   · PRN IV BB     Acute encephalopathy d/t CVA  · Improving  · EEG normal     Prediabetes   · Hgb A1c 5.6  · BS AC&HS  · SSI    25.0 - 29.9 Overweight / Body mass index is 28.63 kg/m².     Code status: Full  Prophylaxis: Lovenox  Recommended Disposition: SNF/LTC     Subjective:     Chief Complaint / Reason for Physician Visit  \"I feel better. \"  Patient continues with slurred speech. Patient alert and oriented. Discussed with RN events overnight. Review of Systems:  Symptom Y/N Comments  Symptom Y/N Comments   Fever/Chills n   Chest Pain n    Poor Appetite n   Edema     Cough    Abdominal Pain n    Sputum    Joint Pain     SOB/HOWARD n   Pruritis/Rash     Nausea/vomit    Tolerating PT/OT y    Diarrhea    Tolerating Diet y    Constipation    Other       Could NOT obtain due to:      Objective:     VITALS:   Last 24hrs VS reviewed since prior progress note. Most recent are:  Patient Vitals for the past 24 hrs:   Temp Pulse Resp BP SpO2   03/27/20 1130 98.4 °F (36.9 °C) 67 18 111/76 99 %   03/27/20 1047    148/83    03/27/20 1040  71  141/83    03/27/20 1037  72  141/77    03/27/20 0731 98.7 °F (37.1 °C) 88 16 148/75 96 %   03/27/20 0400  88      03/27/20 0308 98.2 °F (36.8 °C) 87 16 130/66 98 %   03/27/20 0000  80      03/26/20 2252 98.9 °F (37.2 °C) 90 18 134/86 100 %   03/26/20 1957 98.7 °F (37.1 °C) 76 18 134/78 96 %   03/26/20 1536 97.6 °F (36.4 °C) 67 18 126/87 96 %       Intake/Output Summary (Last 24 hours) at 3/27/2020 1249  Last data filed at 3/26/2020 1327  Gross per 24 hour   Intake 100 ml   Output    Net 100 ml        PHYSICAL EXAM:  General: Ill-appearing elderly AA female. NAD  EENT:  EOMI. Anicteric sclerae. MMM  Resp:  CTA bilaterally, no wheezing or rales. No accessory muscle use  CV:  Regular rate rhythm,  No edema  GI:  Soft, Non distended, Non tender.  +Bowel sounds  Neurologic:  Alert and oriented X 3, slurred speech   Psych:   Good insight. Not anxious nor agitated  Skin:  No rashes.   No jaundice    Reviewed most current lab test results and cultures  YES  Reviewed most current radiology test results   YES  Review and summation of old records today    NO  Reviewed patient's current orders and MAR    YES  PMH/SH reviewed - no change compared to H&P  ________________________________________________________________________  Care Plan discussed with:    Comments   Patient x    Family      RN x    Care Manager x    Consultant                        Multidiciplinary team rounds were held today with , nursing, pharmacist and clinical coordinator. Patient's plan of care was discussed; medications were reviewed and discharge planning was addressed. ________________________________________________________________________  Total NON critical care TIME:  25   Minutes    Total CRITICAL CARE TIME Spent:   Minutes non procedure based      Comments   >50% of visit spent in counseling and coordination of care     ________________________________________________________________________  Melecio Saliva, NP     Procedures: see electronic medical records for all procedures/Xrays and details which were not copied into this note but were reviewed prior to creation of Plan. LABS:  I reviewed today's most current labs and imaging studies.   Pertinent labs include:  Recent Labs     03/27/20  0028 03/25/20  0720   WBC 13.2* 6.9   HGB 13.8 13.9   HCT 43.0 43.2    240     Recent Labs     03/27/20  0028 03/25/20  0720    142   K 3.6 4.3   * 111*   CO2 24 26   * 123*   BUN 16 13   CREA 0.74 0.81   CA 8.8 8.9   ALB 3.2* 3.6   TBILI 0.5 0.4   SGOT 10* 17   ALT 19 21   INR  --  1.0       Signed: Melecio Saliva, NP

## 2020-03-27 NOTE — PROGRESS NOTES
2330: Telemetry called out and said she has been in sinus tach sustaining 130-140's for 3 minutes. She has been up and down with these rates for about 30 minutes. Patient has extreme slurred speech and is she is very hard to understand, denies chest pain, but points at her arm and neck. Reached out to tele-hospitalist in regards to ordering an EKG, waiting for further instructions. 2343:  Spoke with Dr. Von Tierney, orders for EKG and 2.5mg of metoprolol to be given. Will continue to monitor. 0005: EKG done, Metoprolol given, heart rate has come down into the 70's. Will continue to monitor patient. 0430: Telemetry called and said patient having bursts of sinus tach in the 140's again. At this time will reach out to tele-hospitalist for a cardiology consult.

## 2020-03-27 NOTE — PROGRESS NOTES
Problem: Falls - Risk of  Goal: *Absence of Falls  Description: Document Tim Willams Fall Risk and appropriate interventions in the flowsheet. Outcome: Progressing Towards Goal  Note: Fall Risk Interventions:  Mobility Interventions: Bed/chair exit alarm, Patient to call before getting OOB, PT Consult for mobility concerns, PT Consult for assist device competence    Mentation Interventions: Adequate sleep, hydration, pain control, Bed/chair exit alarm, Door open when patient unattended, Reorient patient, More frequent rounding    Medication Interventions: Bed/chair exit alarm, Patient to call before getting OOB, Teach patient to arise slowly    Elimination Interventions: Call light in reach, Bed/chair exit alarm, Patient to call for help with toileting needs, Stay With Me (per policy), Toileting schedule/hourly rounds    History of Falls Interventions: Bed/chair exit alarm, Door open when patient unattended         Problem: Patient Education: Go to Patient Education Activity  Goal: Patient/Family Education  Outcome: Progressing Towards Goal     Problem: Patient Education: Go to Patient Education Activity  Goal: Patient/Family Education  Outcome: Progressing Towards Goal     Problem: Patient Education: Go to Patient Education Activity  Goal: Patient/Family Education  Outcome: Progressing Towards Goal     Problem: Pressure Injury - Risk of  Goal: *Prevention of pressure injury  Description: Document Salvatore Scale and appropriate interventions in the flowsheet. Outcome: Progressing Towards Goal  Note: Pressure Injury Interventions:  Sensory Interventions: Assess need for specialty bed, Avoid rigorous massage over bony prominences, Check visual cues for pain, Discuss PT/OT consult with provider, Float heels, Keep linens dry and wrinkle-free, Maintain/enhance activity level, Minimize linen layers, Monitor skin under medical devices, Pressure redistribution bed/mattress (bed type), Turn and reposition approx.  every two hours (pillows and wedges if needed)    Moisture Interventions: Absorbent underpads, Apply protective barrier, creams and emollients, Check for incontinence Q2 hours and as needed, Internal/External urinary devices, Minimize layers, Offer toileting Q_hr    Activity Interventions: Assess need for specialty bed, Increase time out of bed, PT/OT evaluation, Pressure redistribution bed/mattress(bed type)    Mobility Interventions: Assess need for specialty bed, HOB 30 degrees or less, Pressure redistribution bed/mattress (bed type), PT/OT evaluation, Turn and reposition approx.  every two hours(pillow and wedges)    Nutrition Interventions: Document food/fluid/supplement intake, Offer support with meals,snacks and hydration    Friction and Shear Interventions: Apply protective barrier, creams and emollients, Foam dressings/transparent film/skin sealants, Lift team/patient mobility team, Minimize layers                Problem: Patient Education: Go to Patient Education Activity  Goal: Patient/Family Education  Outcome: Progressing Towards Goal     Problem: Patient Education: Go to Patient Education Activity  Goal: Patient/Family Education  Outcome: Progressing Towards Goal     Problem: TIA/CVA Stroke: Day 2 Until Discharge  Goal: Off Pathway (Use only if patient is Off Pathway)  Outcome: Progressing Towards Goal  Goal: Activity/Safety  Outcome: Progressing Towards Goal  Goal: Diagnostic Test/Procedures  Outcome: Progressing Towards Goal  Goal: Nutrition/Diet  Outcome: Progressing Towards Goal  Goal: Discharge Planning  Outcome: Progressing Towards Goal  Goal: Medications  Outcome: Progressing Towards Goal  Goal: Respiratory  Outcome: Progressing Towards Goal  Goal: Treatments/Interventions/Procedures  Outcome: Progressing Towards Goal  Goal: Psychosocial  Outcome: Progressing Towards Goal  Goal: *Verbalizes anxiety and depression are reduced or absent  Outcome: Progressing Towards Goal  Goal: *Absence of aspiration  Outcome: Progressing Towards Goal  Goal: *Absence of deep venous thrombosis signs and symptoms(Stroke Metric)  Outcome: Progressing Towards Goal  Goal: *Optimal pain control at patient's stated goal  Outcome: Progressing Towards Goal  Goal: *Tolerating diet  Outcome: Progressing Towards Goal  Goal: *Ability to perform ADLs and demonstrates progressive mobility and function  Outcome: Progressing Towards Goal  Goal: *Stroke education continued(Stroke Metric)  Outcome: Progressing Towards Goal     Problem: Patient Education: Go to Patient Education Activity  Goal: Patient/Family Education  Outcome: Progressing Towards Goal

## 2020-03-27 NOTE — PROCEDURES
Cisne Sarwat Hurd, [address]      Electroencephalogram    Procedure ID: [id number] Procedure Date: 03/26/2019   Patient Name: Ramone Power YOB: 1940   Procedure Type: Routine, Brain death Medical Record No: [id number]     INDICATION: Altered mental status      IMPRESSION:  Normal EEG. Absence of seizures on this study does not exclude epilepsy. Clinical correlation is advised. DESCRIPTION OF PROCEDURE: Electrodes were applied in accordance with the international 10-20 system of electrode placement. EEG was reviewed in both bipolar and referential montages. DESCRIPTION OF FINDINGS: Background consists of symmetric  9Hz posterior dominant rhythm which attenuates with eye opening. With photic stimulation a symmetric occipital driving response is noted. No seizures were noted.       Medications:  Current Facility-Administered Medications   Medication Dose Route Frequency    aspirin tablet 325 mg  325 mg Oral DAILY    acetaminophen (TYLENOL) tablet 650 mg  650 mg Oral Q6H PRN    ondansetron (ZOFRAN) injection 4 mg  4 mg IntraVENous Q4H PRN    sodium chloride (NS) flush 5-40 mL  5-40 mL IntraVENous Q8H    sodium chloride (NS) flush 5-40 mL  5-40 mL IntraVENous PRN    enoxaparin (LOVENOX) injection 40 mg  40 mg SubCUTAneous Q24H    metoprolol tartrate (LOPRESSOR) tablet 50 mg  50 mg Oral BID    losartan (COZAAR) tablet 100 mg  100 mg Oral DAILY    atorvastatin (LIPITOR) tablet 40 mg  40 mg Oral QHS    dilTIAZem CD (CARDIZEM CD) capsule 240 mg  240 mg Oral DAILY    insulin lispro (HUMALOG) injection   SubCUTAneous TIDAC    glucose chewable tablet 16 g  4 Tab Oral PRN    dextrose (D50W) injection syrg 12.5-25 g  12.5-25 g IntraVENous PRN    glucagon (GLUCAGEN) injection 1 mg  1 mg IntraMUSCular PRN    influenza vaccine 2019-20 (6 mos+)(PF) (FLUARIX/FLULAVAL/FLUZONE QUAD) injection 0.5 mL  0.5 mL IntraMUSCular PRIOR TO DISCHARGE    acetaminophen (TYLENOL) tablet 650 mg  650 mg Oral Q4H PRN    Or    acetaminophen (TYLENOL) solution 650 mg  650 mg Per NG tube Q4H PRN    Or    acetaminophen (TYLENOL) suppository 650 mg  650 mg Rectal Q4H PRN

## 2020-03-27 NOTE — PROGRESS NOTES
EREN:    Inpatient Rehab  EMTALA   2nd IM Medicare Letter  Medical Transport      CM informed that pt has been accepted to [address] [place] [address] for inpatient rehab, and they are willing to admit on 3/28/20. CM was asked if transport can be arranged for 10:00AM.  CM arranged time with [facility]. Pt will require EMTALA at the time of d/c. Please fax: D/C [facility] and [phone]. Call report: [phone]. Accepting MD: Orly Duffy. Pt will require 2nd IM Medicare Letter. CM will provide transport packet to pt's nurse (located in pt chart). CM will continue to follow.     LYNNE Chaudhry, [address] [place] [address]

## 2020-03-27 NOTE — PROGRESS NOTES
* No surgery found *  * No surgery found *  Bedside shift change report given to Jaren Roach (oncoming nurse) by May Salazar (offgoing nurse). Report included the following information SBAR, Kardex, ED Summary and Intake/Output. Zone Phone:   [phone]    Significant changes during shift:  No changes    Patient Information    George Mccarthy  70 y.o.  3/25/2020  7:10 AM by Jovita Trejo MD. George Fitting was admitted from Home    Problem List    Patient Active Problem List    Diagnosis Date Noted    Aphasia 03/25/2020    Bilateral carotid artery stenosis 03/25/2020     Past Medical History:   Diagnosis Date    Diabetes ([facility] [address]     Hypertension      Core Measures:    CVA: Yes Yes  CHF:No No  PNA:No No    Activity Status:    OOB to Chair Yes  Ambulated this shift Yes   Bed Rest No    LINES AND DRAINS:    PIV    DVT prophylaxis:    DVT prophylaxis Med- Yes  DVT prophylaxis SCD or SYLVIA- No     Wounds: (If Applicable)    Wounds- No    Patient Safety:    Falls Score Total Score: 4  Safety Level_______  Bed Alarm On? Yes  Sitter?  No    Plan for upcoming shift: safety and discharge planning    Discharge Plan:TBD    Active Consults:  IP CONSULT TO HOSPITALIST  IP CONSULT TO NEUROLOGY

## 2020-03-27 NOTE — PROGRESS NOTES
Pt has episodes of ST and A fib, d/w RN we will let primary team decide if patient needs cardiology consult.

## 2020-03-27 NOTE — PROGRESS NOTES
Problem: Mobility Impaired (Adult and Pediatric)  Goal: *Acute Goals and Plan of Care (Insert Text)  Description: FUNCTIONAL STATUS PRIOR TO ADMISSION: Patient was independent without use of DME. Reports having balance issues and \"needing a cane\" prior to admit. HOME SUPPORT PRIOR TO ADMISSION: The patient lived alone with daughter to provide assistance. Daughter works intermittently and patient is alone when daughter is at work. Physical Therapy Goals  Initiated 3/25/2020  1. Patient will move from supine to sit and sit to supine  in bed with modified independence within 7 day(s). 2.  Patient will transfer from bed to chair and chair to bed with modified independence using the least restrictive device within 7 day(s). 3.  Patient will perform sit to stand with modified independence within 7 day(s). 4.  Patient will ambulate with modified independence for 250 feet with the least restrictive device within 7 day(s). 5.  Patient will complete [place] assessment and goal will be set within 7 days. Outcome: Not Met   PHYSICAL THERAPY TREATMENT  Patient: Gwendolyn Echeverria (18 y.o. female)  Date: 3/27/2020  Diagnosis: Aphasia [R47.01]   <principal problem not specified>       Precautions: Fall  Chart, physical therapy assessment, plan of care and goals were reviewed. ASSESSMENT  Patient continues with skilled PT services and is not progressing towards goals. Patient received in bed and agreeable to participate, marked dysarthria noted and patient tends to answer questions with single word. Patient required min assist to come to edge of bed and sitting balance is intact. Patient required min assist to come to stand to RW and ambulated x 15 feet with slow pace and decreased step length, had difficulty advancing left LE and required cuing for posture and head position, tends to be very flexed. Patient with weakness of left LE and noted impaired motor coordination and grading of movement.   Patient performed seated there ex for left LE with neuro facilitation for control and fluid pacing. Patient left up in chair with call bell in reach. Patient is a good candidate for inpatient rehab, was cooperative during session and exerted good effort, remains well below baseline. Current Level of Function Impacting Discharge (mobility/balance): min assist with RW,  mod assist to scoot    Other factors to consider for discharge: falls risk, well below baseline, was living indep PTA         PLAN :  Patient continues to benefit from skilled intervention to address the above impairments. Continue treatment per established plan of care. to address goals. Recommendation for discharge: (in order for the patient to meet his/her long term goals)  Therapy 3 hours per day 5-7 days per week    This discharge recommendation:  Has been made in collaboration with the attending provider and/or case management    IF patient discharges home will need the following DME: to be determined (TBD)       SUBJECTIVE:   Patient stated Laurentmarilee Moreno.     OBJECTIVE DATA SUMMARY:   Critical Behavior:  Neurologic State: Alert  Orientation Level: Oriented to person, Oriented to place(hard to understand)  Cognition: Follows commands  Safety/Judgement: Decreased awareness of environment, Decreased awareness of need for safety, Fall prevention  Functional Mobility Training:  Bed Mobility:  Rolling: Minimum assistance  Supine to Sit: Minimum assistance     Scooting: Moderate assistance;Assist x2        Transfers:  Sit to Stand: Minimum assistance  Stand to Sit: Minimum assistance        Bed to Chair: Minimum assistance                    Balance:  Sitting: Intact  Standing: Impaired  Standing - Static: Constant support; Fair  Standing - Dynamic : Constant support; Fair  Ambulation/Gait Training:  Distance (ft): 15 Feet (ft)  Assistive Device: Gait belt;Walker, rolling  Ambulation - Level of Assistance: Minimal assistance;Assist x1        Gait Abnormalities: Decreased step clearance(difficulty advancing left)        Base of Support: Widened     Speed/Giana: Slow  Step Length: Left shortened;Right shortened                    Stairs: Therapeutic Exercises:   Left LE LAQ, marching, ankle pumps, leg press with tactile cuing and nuero facilitation  Pain Rating:      Activity Tolerance:   Fair  Please refer to the flowsheet for vital signs taken during this treatment. After treatment patient left in no apparent distress:   Sitting in chair, Call bell within reach, Bed / chair alarm activated, and Caregiver / family present    COMMUNICATION/COLLABORATION:   The patients plan of care was discussed with: Occupational therapist, Registered nurse, and Case management.      Dayna Greenberg, PT   Time Calculation: 34 mins

## 2020-03-27 NOTE — PROGRESS NOTES
Patients -140, hx is limited due to stroke. No distress per RN. IV lopressor 2.5 mg once, EKG ordered.

## 2020-03-27 NOTE — PROGRESS NOTES
EREN:    Inpatient Rehab  FOC/2nd  Medicare   Insurance Auth    UPDATE: 2:24PM    CM received call from rep from [address] [place] [address], in regards to pt's referral.  CM was informed that referral was accepted and insurance Gisele Evangelistaer is currently pending. CM informed that if insurance Gisele Righter is approved on today, they will admit on 3/28/20. CM contact pt's daughter: Jose Alfredo Giordano, via telephone and informed her of the following. Jose Alfredo Giordano reported that she is agreeable with referral going to [address] [place] [address], and possible d/c on 3/28/20. CM will continue to follow. LYNNE Disla, [facility]          UPDATE: 1:02PM    CM informed by [facility] liaison that pt insurance is not in network with [facility], and that her referral will need to be sent to [facility] providers. CM will inform pt;s daughter of the following and send referral to [facility]. Referral currently pending. CM will continue to follow. LYNNE Disla, [facility]      CM: Hiram Gorman is currently working with pt in the Neuro Unit. CM informed during IDRs that pt is in need of inpatient rehab, prior to d/c.  CM contact pt's daughter, via telephone and informed her of the following. Pt's daughter agreeable to referral being sent to [facility] (acceptance pending). CM informed pt's daughter of [address] document (verbal consent) placed in chart. CM will continue to follow.     LYNNE Disla, [address] [place] [address]

## 2020-03-27 NOTE — PROGRESS NOTES
Problem: Self Care Deficits Care Plan (Adult)  Goal: *Acute Goals and Plan of Care (Insert Text)  Description: FUNCTIONAL STATUS PRIOR TO ADMISSION: ambulated without assist devices but reported that she needed a cane (does not have one), sits down in the bottom of the tub to bathe and gets back up on her own, home alone during the day while her daughter works, performed ADLs on her own, had some slurred speech at [facility]: The patient lived with daughter but did not require assist.    Occupational Therapy Goals:  Initiated 3/25/2020  1. Patient will perform grooming standing without cues for location of object on the left with supervision within 7 days. 2. Patient will perform toileting with supervision within 7 days. 3. Patient will perform dressing with supervision within 7 days. 4. Patient will transfer from toilet with supervision using the least restrictive device and appropriate durable medical equipment within 7 days. 5. Patient will improve fugl ingram score by 6 points within 7 days. Outcome: Progressing Towards Goal   OCCUPATIONAL THERAPY TREATMENT  Patient: Radha Plunkett (58 y.o. female)  Date: 3/27/2020  Diagnosis: Aphasia [R47.01]   <principal problem not specified>       Precautions: Fall  Chart, occupational therapy assessment, plan of care, and goals were reviewed. ASSESSMENT  Patient continues with skilled OT services and is minimally progressing towards goals. Pt's is quite dysarthric and very difficult to understand which is a recent change. Performed oral care with thickened liquid using toothbrush while seated in chair this date, she cannot hold a cup with her dominant LUE and bring it to her mouth. Notable decreased in dominant LUE functional abilities since initial OT Evaluation.    Pt will benefit from intensive inpatient rehab at discharge    Current Level of Function Impacting Discharge (ADLs): needs minimal to moderate assistance using her LUE for self care activities. Other factors to consider for discharge: pt was independent PTA         PLAN :  Patient continues to benefit from skilled intervention to address the above impairments. Continue treatment per established plan of care. to address goals. Recommend with staff: encourage safe use of LUE and encourage pt to perform UE exercises    Recommend next OT session: neuro re-ed LUE/ toilet transfer    Recommendation for discharge: (in order for the patient to meet his/her long term goals)  Therapy 3 hours per day 5-7 days per week    This discharge recommendation:  Has not yet been discussed the attending provider and/or case management    IF patient discharges home will need the following DME: tbd       SUBJECTIVE:   Patient stated thank you.   (very difficult to understand speech)    OBJECTIVE DATA SUMMARY:   Cognitive/Behavioral Status:  Neurologic State: Alert  Orientation Level: Oriented to person;Oriented to place(hard to understand)  Cognition: Follows commands  Perception: Cues to attend left visual field;Cues to attend to left side of body  Perseveration: No perseveration noted; Tactile cues provided;Verbal cues provided;Visual cues provided  Safety/Judgement: Decreased awareness of environment;Decreased awareness of need for safety; Fall prevention    Functional Mobility and Transfers for ADLs:  Bed Mobility:  Rolling: Minimum assistance  Supine to Sit: Minimum assistance  Scooting: Moderate assistance;Assist x2  Ambulated around bed to chair with assist X2. Transfers:  Sit to Stand: Minimum assistance         Balance:  Sitting: Intact  Standing: Impaired  Standing - Static: Constant support; Fair  Standing - Dynamic : Constant support; Fair    ADL Intervention:  Feeding  Drink to Mouth: Minimum assistance(needs assist using L UE)   independent with RUE    Grooming  Position Performed: Seated in chair  Washing Face:  Moderate assistance  Brushing Teeth: Set-up(using R hand), unable to perform using L hand  Noted white tongue-nursing informed                   Lower Body Dressing Assistance  Socks: (able to lean forward and adjust R sock using RUE)  Position Performed: Seated in chair         Cognitive Retraining  Attention to Task: Distractibility  Following Commands: Follows one step commands/directions  Safety/Judgement: Decreased awareness of environment;Decreased awareness of need for safety; Fall prevention  Cues: Tactile cues provided;Verbal cues provided;Visual cues provided    Therapeutic Exercises:   Educated in BUE ROM with clasped hands   Neuroreducation LUE in weight bearing and repetition followed by reach, grasp, place activities    Pain:  No pain reported    Activity Tolerance:   Good  Please refer to the flowsheet for vital signs taken during this treatment. After treatment patient left in no apparent distress:   Sitting in chair, Call bell within reach, Bed / chair alarm activated and nursing informed    COMMUNICATION/COLLABORATION:   The patients plan of care was discussed with: Physical therapist and Registered nurse.      Maya Matthew OTR/L  Time Calculation: 55 mins

## 2020-03-27 NOTE — PROGRESS NOTES
* No surgery found * 
* No surgery found * Bedside shift change report given to  (oncoming nurse) by Nikita (offgoing nurse). Report included the following information SBAR, Kardex, ED Summary and Intake/Output. Zone Phone:   [phone] Significant changes during shift:   
Patient with ST bursts throughout the night- please see notes. Patient Information Idalia Arriaga 
70 y.o. 
3/25/2020  7:10 AM by Delmar Boeck, MD. Idalia Arriaga was admitted from Home 
 
Problem List 
 
Patient Active Problem List  
 Diagnosis Date Noted  Aphasia 03/25/2020  Bilateral carotid artery stenosis 03/25/2020 Past Medical History:  
Diagnosis Date  Diabetes ([facility] [address]  Hypertension Core Measures: CVA: Yes Yes CHF:No No 
PNA:No No 
 
Activity Status: OOB to Chair Yes Ambulated this shift Yes Bed Rest No 
 
LINES AND DRAINS: 
 
PIV 
 
DVT prophylaxis: DVT prophylaxis Med- Yes DVT prophylaxis SCD or SYLVIA- No  
 
Wounds: (If Applicable) Wounds- No 
 
Patient Safety: 
 
Falls Score Total Score: 2 Safety Level_______ Bed Alarm On? Yes Sitter? No 
 
Plan for upcoming shift:  
safety and discharge planning Possible need for Cardiology Consult? Discharge Plan:TBD Active Consults: 
IP CONSULT TO HOSPITALIST 
IP CONSULT TO NEUROLOGY

## 2020-03-28 PROBLEM — D68.69 HYPERCOAGULABILITY DUE TO ATRIAL FIBRILLATION (HCC): Status: ACTIVE | Noted: 2020-03-28

## 2020-03-28 PROBLEM — E78.5 HLD (HYPERLIPIDEMIA): Status: ACTIVE | Noted: 2020-03-28

## 2020-03-28 PROBLEM — I10 HTN (HYPERTENSION): Status: ACTIVE | Noted: 2020-03-28

## 2020-03-28 PROBLEM — I48.91 HYPERCOAGULABILITY DUE TO ATRIAL FIBRILLATION (HCC): Status: ACTIVE | Noted: 2020-03-28

## 2020-03-28 PROBLEM — I48.0 PAROXYSMAL A-FIB (HCC): Status: ACTIVE | Noted: 2020-03-28

## 2020-03-28 PROBLEM — I63.9 ACUTE CVA (CEREBROVASCULAR ACCIDENT) (HCC): Status: ACTIVE | Noted: 2020-03-28

## 2020-03-28 PROBLEM — R73.03 PREDIABETES: Status: ACTIVE | Noted: 2020-03-28

## 2020-03-28 PROBLEM — G93.40 ACUTE ENCEPHALOPATHY: Status: ACTIVE | Noted: 2020-03-28

## 2020-03-28 LAB
ANION GAP SERPL CALC-SCNC: 5 MMOL/L (ref 5–15)
BUN SERPL-MCNC: 18 MG/DL (ref 6–20)
BUN/CREAT SERPL: 24 (ref 12–20)
CALCIUM SERPL-MCNC: 9.1 MG/DL (ref 8.5–10.1)
CHLORIDE SERPL-SCNC: 114 MMOL/L (ref 97–108)
CO2 SERPL-SCNC: 23 MMOL/L (ref 21–32)
CREAT SERPL-MCNC: 0.75 MG/DL (ref 0.55–1.02)
ERYTHROCYTE [DISTWIDTH] IN BLOOD BY AUTOMATED COUNT: 15.9 % (ref 11.5–14.5)
GLUCOSE BLD STRIP.AUTO-MCNC: 112 MG/DL (ref 65–100)
GLUCOSE BLD STRIP.AUTO-MCNC: 116 MG/DL (ref 65–100)
GLUCOSE BLD STRIP.AUTO-MCNC: 145 MG/DL (ref 65–100)
GLUCOSE BLD STRIP.AUTO-MCNC: 153 MG/DL (ref 65–100)
GLUCOSE SERPL-MCNC: 104 MG/DL (ref 65–100)
HCT VFR BLD AUTO: 42.6 % (ref 35–47)
HGB BLD-MCNC: 13.4 G/DL (ref 11.5–16)
MCH RBC QN AUTO: 29.6 PG (ref 26–34)
MCHC RBC AUTO-ENTMCNC: 31.5 G/DL (ref 30–36.5)
MCV RBC AUTO: 94.2 FL (ref 80–99)
NRBC # BLD: 0 K/UL (ref 0–0.01)
NRBC BLD-RTO: 0 PER 100 WBC
PLATELET # BLD AUTO: 233 K/UL (ref 150–400)
PMV BLD AUTO: 10.9 FL (ref 8.9–12.9)
POTASSIUM SERPL-SCNC: 3.9 MMOL/L (ref 3.5–5.1)
RBC # BLD AUTO: 4.52 M/UL (ref 3.8–5.2)
SERVICE CMNT-IMP: ABNORMAL
SODIUM SERPL-SCNC: 142 MMOL/L (ref 136–145)
TSH SERPL DL<=0.05 MIU/L-ACNC: 1.69 UIU/ML (ref 0.36–3.74)
WBC # BLD AUTO: 6.9 K/UL (ref 3.6–11)

## 2020-03-28 PROCEDURE — 65660000000 HC RM CCU STEPDOWN

## 2020-03-28 PROCEDURE — 74011250636 HC RX REV CODE- 250/636: Performed by: HOSPITALIST

## 2020-03-28 PROCEDURE — 85027 COMPLETE CBC AUTOMATED: CPT

## 2020-03-28 PROCEDURE — 74011250637 HC RX REV CODE- 250/637: Performed by: NURSE PRACTITIONER

## 2020-03-28 PROCEDURE — 84443 ASSAY THYROID STIM HORMONE: CPT

## 2020-03-28 PROCEDURE — 74011250637 HC RX REV CODE- 250/637: Performed by: PSYCHIATRY & NEUROLOGY

## 2020-03-28 PROCEDURE — 74011250637 HC RX REV CODE- 250/637: Performed by: HOSPITALIST

## 2020-03-28 PROCEDURE — 36415 COLL VENOUS BLD VENIPUNCTURE: CPT

## 2020-03-28 PROCEDURE — 82962 GLUCOSE BLOOD TEST: CPT

## 2020-03-28 PROCEDURE — 90471 IMMUNIZATION ADMIN: CPT

## 2020-03-28 PROCEDURE — 90686 IIV4 VACC NO PRSV 0.5 ML IM: CPT | Performed by: HOSPITALIST

## 2020-03-28 PROCEDURE — 80048 BASIC METABOLIC PNL TOTAL CA: CPT

## 2020-03-28 RX ORDER — ASPIRIN 325 MG
325 TABLET ORAL DAILY
Qty: 30 TAB | Refills: 0 | Status: SHIPPED | OUTPATIENT
Start: 2020-03-29

## 2020-03-28 RX ORDER — ATORVASTATIN CALCIUM 40 MG/1
40 TABLET, FILM COATED ORAL
Qty: 30 TAB | Refills: 0 | Status: SHIPPED | OUTPATIENT
Start: 2020-03-28

## 2020-03-28 RX ADMIN — DILTIAZEM HYDROCHLORIDE 60 MG: 30 TABLET, FILM COATED ORAL at 21:55

## 2020-03-28 RX ADMIN — METOPROLOL TARTRATE 50 MG: 50 TABLET, FILM COATED ORAL at 08:29

## 2020-03-28 RX ADMIN — Medication 10 ML: at 17:10

## 2020-03-28 RX ADMIN — Medication 10 ML: at 05:21

## 2020-03-28 RX ADMIN — Medication 10 ML: at 21:57

## 2020-03-28 RX ADMIN — ASPIRIN 325 MG ORAL TABLET 325 MG: 325 PILL ORAL at 08:29

## 2020-03-28 RX ADMIN — DILTIAZEM HYDROCHLORIDE 60 MG: 30 TABLET, FILM COATED ORAL at 17:09

## 2020-03-28 RX ADMIN — LOSARTAN POTASSIUM 100 MG: 50 TABLET, FILM COATED ORAL at 08:29

## 2020-03-28 RX ADMIN — INFLUENZA VIRUS VACCINE 0.5 ML: 15; 15; 15; 15 SUSPENSION INTRAMUSCULAR at 09:12

## 2020-03-28 RX ADMIN — ATORVASTATIN CALCIUM 40 MG: 40 TABLET, FILM COATED ORAL at 21:55

## 2020-03-28 RX ADMIN — ENOXAPARIN SODIUM 40 MG: 40 INJECTION SUBCUTANEOUS at 17:09

## 2020-03-28 RX ADMIN — DILTIAZEM HYDROCHLORIDE 60 MG: 30 TABLET, FILM COATED ORAL at 08:29

## 2020-03-28 RX ADMIN — DILTIAZEM HYDROCHLORIDE 60 MG: 30 TABLET, FILM COATED ORAL at 12:54

## 2020-03-28 RX ADMIN — METOPROLOL TARTRATE 50 MG: 50 TABLET, FILM COATED ORAL at 17:09

## 2020-03-28 NOTE — PROGRESS NOTES
Notified pt's daughter Radha Suero that pt.'s discharged to rehab is canceled today and reschedule for tomorrow at 9:00 am.

## 2020-03-28 NOTE — PROGRESS NOTES
Neurology Note    Patient ID:  Nora Arthur  [id number]  40 y.o.  1940      Date of Consultation:  March 28, 2020    Subjective: I still have slurred speech       History of Present Illness:   Nora Arthur is a 70 y.o. female who was initially admitted to the hospital on March 25, 2020 with slurred speech and left-sided facial droop. She has a history of hypertension, diabetes, dyslipidemia and a prior stroke. She has been followed during the hospitalization by 1 of my colleagues,Dr. Short. She did have an white matter disease and an acute infarct in the right posterior corona radiata. She was started on Plavix. Her hemoglobin A1c was 5.6. She did have an EEG during the hospitalization which was normal.    This a.m., the patient continues to be markedly dysarthric which limits communication. She does shake her head yes now and denies any pain    Past Medical History:   Diagnosis Date    Diabetes ([address]     Hypertension         No past surgical history on file. No family history on file. Social History     Tobacco Use    Smoking status: Passive Smoke Exposure - Never Smoker   Substance Use Topics    Alcohol use: No        No Known Allergies     Prior to Admission medications    Medication Sig Start Date End Date Taking? Authorizing Provider   aspirin (ASPIRIN) 325 mg tablet Take 1 Tab by mouth daily. 3/29/20  Yes Estelita Vasquez NP   atorvastatin (LIPITOR) 40 mg tablet Take 1 Tab by mouth nightly. 3/28/20  Yes Estelita Vasquez NP   pravastatin (PRAVACHOL) 20 mg tablet Take 20 mg by mouth nightly. Yes Other, MD Terri   diltiazem CD (CARTIA XT) 240 mg ER capsule Take 240 mg by mouth daily. Yes Other, MD Terri   metFORMIN (GLUCOPHAGE) 500 mg tablet Take 500 mg by mouth two (2) times daily (with meals). Yes Other, MD Terri   losartan (COZAAR) 100 mg tablet Take 100 mg by mouth daily.    Yes Cristofer, MD Terri   metoprolol tartrate (LOPRESSOR) 50 mg tablet Take 50 mg by mouth two (2) times a day. Yes Cristofer, MD Terri   ergocalciferol (VITAMIN D2) 50,000 unit capsule Take 50,000 Units by mouth every month. Yes Cristofer, MD Terri   diazepam (VALIUM) 2 mg tablet Take 1 Tab by mouth every eight (8) hours as needed (muscle spasm).  Max Daily Amount: 6 mg. 10/13/16  Yes Mike Burnett MD     Current Facility-Administered Medications   Medication Dose Route Frequency    dilTIAZem (CARDIZEM) IR tablet 60 mg  60 mg Oral AC&HS    aspirin tablet 325 mg  325 mg Oral DAILY    acetaminophen (TYLENOL) tablet 650 mg  650 mg Oral Q6H PRN    ondansetron (ZOFRAN) injection 4 mg  4 mg IntraVENous Q4H PRN    sodium chloride (NS) flush 5-40 mL  5-40 mL IntraVENous Q8H    sodium chloride (NS) flush 5-40 mL  5-40 mL IntraVENous PRN    enoxaparin (LOVENOX) injection 40 mg  40 mg SubCUTAneous Q24H    metoprolol tartrate (LOPRESSOR) tablet 50 mg  50 mg Oral BID    losartan (COZAAR) tablet 100 mg  100 mg Oral DAILY    atorvastatin (LIPITOR) tablet 40 mg  40 mg Oral QHS    insulin lispro (HUMALOG) injection   SubCUTAneous TIDAC    glucose chewable tablet 16 g  4 Tab Oral PRN    dextrose (D50W) injection syrg 12.5-25 g  12.5-25 g IntraVENous PRN    glucagon (GLUCAGEN) injection 1 mg  1 mg IntraMUSCular PRN    acetaminophen (TYLENOL) tablet 650 mg  650 mg Oral Q4H PRN    Or    acetaminophen (TYLENOL) solution 650 mg  650 mg Per NG tube Q4H PRN    Or    acetaminophen (TYLENOL) suppository 650 mg  650 mg Rectal Q4H PRN       Review of Systems:    General, constitutional: negative  Eyes, vision: negative  Ears, nose, throat: negative  Cardiovascular, heart: negative  Respiratory: negative  Gastrointestinal: negative  Genitourinary: negative  Musculoskeletal: negative  Skin and integumentary: negative  Psychiatric: negative  Endocrine: negative  Neurological: negative, except for HPI  Hematologic/lymphatic: negative  Allergy/immunology: negative    Objective:     Visit Vitals  /73   Pulse 75   Temp 97.5 °F (36.4 °C)   Resp 18   Ht 5' 2\" (1.575 m)   Wt 156 lb 8.4 oz (71 kg)   SpO2 98%   Breastfeeding No   BMI 28.63 kg/m²       Physical Exam:    General:  appears well nourished in no acute distress  Neck: no carotid bruits  Lungs: clear to auscultation  Heart:  no murmurs. Regular rate this am.   Lower extremity: peripheral pulses palpable and no edema  Skin: intact    Neurological exam:    Awake, alert, oriented to person. She has very marked dysarthria which limits her ability to communicate effectively. She will follow one-step commands consistently. She will stick out her tongue, close her eyes, show me a thumb. Cranial nerves:   II-XII were tested    Perrrla  Visual fields were full  Eomi, no evidence of nystagmus  Facial sensation:  normal and symmetric  Facial motor: Left facial droop  Hearing intact  Tongue: midline without fasciculations    Motor: Tone normal    No evidence of fasciculations    Strength testing:   deltoid triceps biceps Wrist ext. Wrist flex. intrinsics Hip flex. Hip ext. Knee ext. Knee flex Dorsi flex Plantar flex   Right 5 5 5 5 5 5 4 4 5 5 5 5   Left 4 4 4 4 4 3 4 4 4 4 4 4         Sensory:  Upper extremity: Decreased to pinprick in the left upper extremity  Lower extremity: Decreased to pinprick in the left lower extremity. Reflexes:    Right Left  Biceps  2 2  Triceps 2 2  Brachiorad.  2 2  Patella  2 2  Achilles 2 2    Cerebellar testing:  no tremor apparent, finger/nose and chadwick were intact, yet weaker on the left    Gait: She does ambulate with a rolling walker and reportedly did walk earlier today    Labs:     Lab Results   Component Value Date/Time    Hemoglobin A1c 5.6 03/26/2020 03:27 AM    Sodium 142 03/28/2020 12:57 AM    Potassium 3.9 03/28/2020 12:57 AM    Chloride 114 (H) 03/28/2020 12:57 AM    Glucose 104 (H) 03/28/2020 12:57 AM    BUN 18 03/28/2020 12:57 AM    Creatinine 0.75 03/28/2020 12:57 AM    Calcium 9.1 03/28/2020 12:57 AM    WBC 6.9 03/28/2020 12:57 AM HCT 42.6 03/28/2020 12:57 AM    HGB 13.4 03/28/2020 12:57 AM    PLATELET 563 34/39/8464 12:57 AM       Imaging:    Results from Hospital Encounter encounter on 03/25/20   MRI BRAIN WO CONT    Narrative INDICATION:   stroke    EXAMINATION:  MRI BRAIN WO CONTRAST    COMPARISON:  October 4, 2009 MRI and CTA March 25, 2020    TECHNIQUE:  Multiplanar multisequence acquisition without contrast of the brain. FINDINGS:      Ventricles:  Midline, no hydrocephalus. Brain Parenchyma/Brainstem:  Extensive chronic white matter disease throughout  the supratentorial brain and to a lesser degree in the cesilia. Several areas of  chronic infarction involving the bilateral corona radiata, corpus callosum,  right thalamus. Small area of acute infarction posterior right corona radiata. Intracranial Hemorrhage:  None. Basal Cisterns:  Normal.   Flow Voids:  Normal.  Additional Comments:  N/A. Impression IMPRESSION:  Small acute infarction posterior right corona radiata. Areas of chronic  infarction and confluent white matter disease as above. Results from [place] encounter on 03/25/20   CT HEAD WO CONT    Narrative EXAM: CT HEAD WO CONT    INDICATION: mental status change    COMPARISON: March 25. CONTRAST: None. TECHNIQUE: Unenhanced CT of the head was performed using 5 mm images. Brain and  bone windows were generated. CT dose reduction was achieved through use of a  standardized protocol tailored for this examination and automatic exposure  control for dose modulation. FINDINGS:  There is no extra-axial fluid collection hemorrhage or shift . remote infarcts and white matter disease, ventricular size, are stable  findings. Impression IMPRESSION: No acute changes. I did independently review the MRI from March 25, 2020. There was an acute stroke in the subcortical region on the right hemisphere.   There was however a significant amount of atrophy and micro-ischemic periventricular chronic changes      Assessment and Plan:  The patient is a pleasant 47-year-old female with a history of prior stroke who developed acute onset of worsening left-sided weakness and significant dysarthria. Acute stroke:  Her MRI does reveal an acute stroke but also a significant amount of chronic micro-ischemic changes and atrophy. Her risk factors for stroke include hypertension, borderline diabetes, dyslipidemia  Continue aspirin and Plavix. Hypertension: Continue aggressive control with goal systolic blood pressure under 140  Dyslipidemia: Continue atorvastatin 40  CTA with no flow-limiting stenosis  Continue with aggressive physical therapy and Occupational Therapy    We will need to consider long-term anticoagulation due to her paroxysmal atrial fibrillation, which has been noted. It has not been started currently due to concerns of fall risk and has been placed on aspirin and Plavix, but will have discussion with outpatient neurologist in follow-up visit about starting medication. The patient will need close follow-up with her primary care doctor and a cardiologist.    The patient will need to follow-up in the neurology clinic in 4 weeks time.        Principal Problem:    Acute CVA (cerebrovascular accident) ([address] (3/28/2020)    Active Problems:    Aphasia (3/25/2020)      Paroxysmal A-fib ([address] (3/28/2020)      Hypercoagulability due to atrial fibrillation ([address] (3/28/2020)      HTN (hypertension) (3/28/2020)      HLD (hyperlipidemia) (3/28/2020)      Acute encephalopathy (3/28/2020)      Prediabetes (3/28/2020)                  Signed By:  Irena Gomez DO FAAN    March 28, 2020

## 2020-03-28 NOTE — PROGRESS NOTES
Called report to [address] [facility] at [facility], [phone]

## 2020-03-28 NOTE — PROGRESS NOTES
I reviewed pertinent labs and imaging, and discussed /agreed on the plan of care with Dr. Erica Delatorre. Hospitalist Progress Note    NAME: Rosy Fallon   :  1940   MRN:  [id number]     Assessment / Plan:  CM following for discharge planning. Discharge to rehab in AM.  Was to discharge today however, transportation was pushed back and rehab stated they could not accept patient at later time today. Slurred speech secondary to   Acute ischemic CVA  · 2020 CT head:  No definite acute process identified. Subtle asymmetry of density in the left M2 segment. CTA is pending. Imaging findings consistent with severe chronic microvascular ischemic change. There is a mild degree of cerebral atrophy  · CTA head:  No flow limiting stenosis or intracranial aneurysm. No major vessel occlusion. Severe chronic microvascular ischemic change with small/moderate remote infarction in the left frontal lobe. Thyroid hypodensities. Nonemergent outpatient thyroid ultrasound when clinically feasible is suggested. · MRI brain:  Small acute infarction posterior right corona radiata. Areas of chronic infarction and confluent white matter disease   · Thyroid US:  Multiple thyroid nodules   · XR swallow:  Mild abnormality of the swallowing mechanism  · 2020 Repeat CT head:  No changes   · Appreciate neurology input   · Plavix once able to take PO  · PT/OT  · ECHO with EF 55-60%  · SLP; recommend purees and nectar thin liquids     Paroxysmal atrial fibrillation  Hypercoagulability secondary to afib  HTN  HLD  · Patient and daughter deny hx of atrial fibrillation. However, patient is currently taking Cardizem and BB. Periods of afib. · Continue Cardizem and BB  · Continue statin, ASA, losartan   · PRN IV BB     Acute encephalopathy d/t CVA  · Improving  · EEG normal     Prediabetes   · Hgb A1c 5.6  · BS AC&HS  · SSI    25.0 - 29.9 Overweight / Body mass index is 28.63 kg/m².     Code status: Full  Prophylaxis: Lovenox  Recommended Disposition: SNF/LTC     Subjective:     Chief Complaint / Reason for Physician Visit  Patient up in [facility] with PCT. Continues with slurred speech. Ambulatory with walker. Updated on discharge plans   Discussed with RN events overnight. Review of Systems:  Symptom Y/N Comments  Symptom Y/N Comments   Fever/Chills n   Chest Pain n    Poor Appetite n   Edema     Cough    Abdominal Pain n    Sputum    Joint Pain     SOB/HOWARD n   Pruritis/Rash     Nausea/vomit    Tolerating PT/OT y    Diarrhea    Tolerating Diet y    Constipation    Other       Could NOT obtain due to:      Objective:     VITALS:   Last 24hrs VS reviewed since prior progress note. Most recent are:  Patient Vitals for the past 24 hrs:   Temp Pulse Resp BP SpO2   03/28/20 1119 97.5 °F (36.4 °C) 75 18 109/73 98 %   03/28/20 0741 98.1 °F (36.7 °C) 85 18 164/86 97 %   03/28/20 0307 98.4 °F (36.9 °C) 72 18 131/85 97 %   03/27/20 2323 98.4 °F (36.9 °C) 64 18 125/72 97 %   03/27/20 1954 98.4 °F (36.9 °C) (!) 57 18 128/76 97 %   03/27/20 1506 98.4 °F (36.9 °C) 68 18 111/68 100 %       Intake/Output Summary (Last 24 hours) at 3/28/2020 1226  Last data filed at 3/27/2020 2323  Gross per 24 hour   Intake 100 ml   Output 100 ml   Net 0 ml        PHYSICAL EXAM:  General: No acute distress. Pleasant elderly AA female. EENT:  EOMI. Anicteric sclerae. MMM  Resp:  Clear bilaterally, no wheezing or rales. No accessory muscle use  CV:  RRR,  No edema  GI:  Soft, Non distended, Non tender.  +Bowel sounds  Neurologic:  Alert and oriented X 3, slurred speech   Psych:   Good insight. Not anxious nor agitated  Skin:  No rashes.   No jaundice    Reviewed most current lab test results and cultures  YES  Reviewed most current radiology test results   YES  Review and summation of old records today    NO  Reviewed patient's current orders and MAR    YES  PMH/SH reviewed - no change compared to H&P  ________________________________________________________________________  Care Plan discussed with:    Comments   Patient x    Family      RN x    Care Manager     Consultant                        Multidiciplinary team rounds were held today with , nursing, pharmacist and clinical coordinator. Patient's plan of care was discussed; medications were reviewed and discharge planning was addressed. ________________________________________________________________________  Total NON critical care TIME:  25   Minutes    Total CRITICAL CARE TIME Spent:   Minutes non procedure based      Comments   >50% of visit spent in counseling and coordination of care     ________________________________________________________________________  Gardenia Teran NP     Procedures: see electronic medical records for all procedures/Xrays and details which were not copied into this note but were reviewed prior to creation of Plan. LABS:  I reviewed today's most current labs and imaging studies.   Pertinent labs include:  Recent Labs     03/28/20  0057 03/27/20  0028   WBC 6.9 13.2*   HGB 13.4 13.8   HCT 42.6 43.0    243     Recent Labs     03/28/20  0057 03/27/20  0028    142   K 3.9 3.6   * 114*   CO2 23 24   * 125*   BUN 18 16   CREA 0.75 0.74   CA 9.1 8.8   ALB  --  3.2*   TBILI  --  0.5   SGOT  --  10*   ALT  --  19       Signed: Gardenia Teran NP

## 2020-03-28 NOTE — DISCHARGE SUMMARY
Hospitalist Discharge Summary     Patient ID:  Wei Aviles  [id number]  70 y.o.  1940  3/25/2020    PCP on record: Leigha Jackson MD    Admit date: 3/25/2020  Discharge date and time: 3/29/2020    DISCHARGE DIAGNOSIS:    Active Hospital Problems    Diagnosis Date Noted    [facility]cerebrovascular [facility]) [address] [address] 03/28/2020    Paroxysmal A-fib [address] [address] 03/28/2020    Hypercoagulability due to atrial fibrillation ([facility] [address] 03/28/2020    HTN (hypertension) 03/28/2020    HLD (hyperlipidemia) 03/28/2020    Acute encephalopathy 03/28/2020    Prediabetes 03/28/2020    Aphasia 03/25/2020     CONSULTATIONS:  IP CONSULT TO HOSPITALIST  IP CONSULT TO NEUROLOGY    Excerpted HPI from H&P of Demetrio Salinas MD:  Yaneth Tavares patient is a 61-year-old -American female who has previous history significant for hypertension, non-insulin dependent diabetes, hyperlipidemia and previous history of stroke who was brought in by ambulance as the patient was not feeling well. I did call her daughter, Marissa Yost, who tells me she was seen by an eye doctor 2 weeks ago and was told that she had a mini stroke. Last night, the patient started to notice that she had some slurred speech. This morning, the patient had left-sided facial droop  she told her daughter to call the ambulance. The patient was brought in. In the emergency room,  code stroke was  called . CT scan of the head did not show any acute process except for severe chronic microvascular ischemic changes. The patient was given a dose of aspirin and now she will be admitted. The patient denies having any limb weakness. She has no tingling, numbness, headache or shortness of breath or chest pain. \"  ______________________________________________________________________  DISCHARGE SUMMARY/HOSPITAL COURSE:  for full details see H&P, daily progress notes, labs, consult notes.    Leigha Vang y.o. female was admitted to [address] [place] [address] on 3/25/2020 and treated for the following medical complaints:     Slurred speech secondary to   Acute ischemic CVA  · 03/25/2020 CT head:  No definite acute process identified. Subtle asymmetry of density in the left M2 segment. CTA is pending. Imaging findings consistent with severe chronic microvascular ischemic change. There is a mild degree of cerebral atrophy  · CTA head:  No flow limiting stenosis or intracranial aneurysm. No major vessel occlusion. Severe chronic microvascular ischemic change with small/moderate remote infarction in the left frontal lobe. Thyroid hypodensities. Nonemergent outpatient thyroid ultrasound when clinically feasible is suggested. · MRI brain:  Small acute infarction posterior right corona radiata. Areas of chronic infarction and confluent white matter disease   · Thyroid US:  Multiple thyroid nodules   · XR swallow:  Mild abnormality of the swallowing mechanism  · 03/26/2020 Repeat CT head:  No changes   · Appreciate neurology input   · Plavix once able to take PO  · PT/OT  · ECHO with EF 55-60%  · SLP; recommend purees and nectar thin liquids      Paroxysmal atrial fibrillation  Hypercoagulability secondary to afib  HTN  HLD  · Patient and daughter deny hx of atrial fibrillation. However, patient is currently taking Cardizem and BB. Periods of afib. · Continue Cardizem and BB  · Continue statin, ASA, losartan   · PRN IV BB      Acute encephalopathy- resolved d/t CVA  · Improving  · EEG normal      Prediabetes   · Hgb A1c 5.6  · BS AC&HS  · SSI    Patient's plan of care has been reviewed with them. Patient and/or family have verbally conveyed their understanding and agreement of the patient's signs, symptoms, diagnosis, treatment and prognosis and additionally agree to follow up as recommended or return to [facility] should their condition change prior to follow-up.   Discharge instructions have also been provided to the patient with some educational information regarding their diagnosis as well a list of reasons why they would want to return to the office prior to their follow-up appointment should their condition change. Alvarez Farris to avoid frequent ED visits. [facility] can treat; pains, sprains, cuts, wounds, high fevers, upper respiratory infections and much more. There medical team is equipped with all the tools necessary to provide advanced medical care in the comfort of you home, workplace, or location of need. The medical team consists of doctors, nurse practitioners, and EMTs. [facility] is available 7 days per week 9 am to 9 pm.   Request care by calling [phone] or by going online at [employer] [facility]  _______________________________________________________________________  Patient seen and examined by me on discharge day. Pertinent Findings:  Gen:    Not in distress  Chest: Clear lungs  CVS:   Regular rhythm. No edema  Abd:  Soft, not distended, not tender  Neuro:  Alert, oriented  _______________________________________________________________________  DISCHARGE MEDICATIONS:   Current Discharge Medication List      START taking these medications    Details   aspirin (ASPIRIN) 325 mg tablet Take 1 Tab by mouth daily. Qty: 30 Tab, Refills: 0        atorvastatin (LIPITOR) 40 mg tablet Take 1 Tab by mouth nightly. Qty: 30 Tab, Refills: 0         CONTINUE these medications which have NOT CHANGED    Details   diltiazem CD (CARTIA XT) 240 mg ER capsule Take 240 mg by mouth daily. metFORMIN (GLUCOPHAGE) 500 mg tablet Take 500 mg by mouth two (2) times daily (with meals). losartan (COZAAR) 100 mg tablet Take 100 mg by mouth daily. metoprolol tartrate (LOPRESSOR) 50 mg tablet Take 50 mg by mouth two (2) times a day. ergocalciferol (VITAMIN D2) 50,000 unit capsule Take 50,000 Units by mouth every month. diazepam (VALIUM) 2 mg tablet Take 1 Tab by mouth every eight (8) hours as needed (muscle spasm).  Max Daily Amount: 6 mg.  Qty: 20 Tab, Refills: 0         STOP taking these medications       pravastatin (PRAVACHOL) 20 mg tablet Comments:   Reason for Stopping:                 Patient Follow Up Instructions: Activity: PT/OT Eval and Treat  Diet: Diabetic Diet  Wound Care: None needed    Follow-up with PCP in 1 week.   Follow-up tests/labs per SNF  Follow-up Information     Follow up With Specialties Details Why Contact Info    Urbano Bhatia MD Neurology   [facility] [address]      Edson Arguelles MD Neurology Schedule an appointment as soon as possible for a visit  [facility]  [facility]  [phone]          ________________________________________________________________    Risk of deterioration: Moderate    Condition at Discharge:  Stable  __________________________________________________________________    Disposition  IP Rehab    ____________________________________________________________________    Code Status: Full Code  ___________________________________________________________________      Total time in minutes spent coordinating this discharge (includes going over instructions, follow-up, prescriptions, and preparing report for sign off to her PCP) :  31 minutes    Signed:  Vito Ray NP

## 2020-03-28 NOTE — PROGRESS NOTES
[facility] liaison, Airam Sesay, called and stated that the accepting physician is unable to receive the patient after 1000 and that this was communicated to the  and attending personnel on Friday, March 27, 2020. Airam Sesay stated that the discharge will be cancelled for today and rescheduled for tomorrow at 0900. I called CRISTHIAN Vasquez to advise her that the discharge was cancelled this morning because the original pickup time for today was 1000 and nursing staff rescheduled the  time for 1215 because [facility] was due to arrive at [facility] at approximately 0940. Sri Villarreal RN sent the NP a message via [employer] at 1041 to advise her that [facility] expected the discharge summary and med list prior to discharge and had not yet been completed. CRISTHIAN Vasquez arrived at 0940 and the note was completed at 1000.

## 2020-03-28 NOTE — PROGRESS NOTES
Bedside shift change report given to [facility] (oncoming nurse) by Eben Lentz (offgoing nurse).  Report included the following information SBAR, Kardex, ED Summary, Intake/Output, MAR, Cardiac Rhythm, and Recent Results.

## 2020-03-29 VITALS
TEMPERATURE: 98.3 F | HEIGHT: 62 IN | SYSTOLIC BLOOD PRESSURE: 146 MMHG | DIASTOLIC BLOOD PRESSURE: 77 MMHG | WEIGHT: 156.53 LBS | OXYGEN SATURATION: 97 % | HEART RATE: 81 BPM | RESPIRATION RATE: 16 BRPM | BODY MASS INDEX: 28.8 KG/M2

## 2020-03-29 LAB
GLUCOSE BLD STRIP.AUTO-MCNC: 98 MG/DL (ref 65–100)
SERVICE CMNT-IMP: NORMAL

## 2020-03-29 PROCEDURE — 94760 N-INVAS EAR/PLS OXIMETRY 1: CPT

## 2020-03-29 PROCEDURE — 74011250637 HC RX REV CODE- 250/637: Performed by: PSYCHIATRY & NEUROLOGY

## 2020-03-29 PROCEDURE — 74011250637 HC RX REV CODE- 250/637: Performed by: HOSPITALIST

## 2020-03-29 PROCEDURE — 74011250637 HC RX REV CODE- 250/637: Performed by: NURSE PRACTITIONER

## 2020-03-29 PROCEDURE — 82962 GLUCOSE BLOOD TEST: CPT

## 2020-03-29 PROCEDURE — 77030038269 HC DRN EXT URIN PURWCK BARD -A

## 2020-03-29 RX ADMIN — ASPIRIN 325 MG ORAL TABLET 325 MG: 325 PILL ORAL at 08:03

## 2020-03-29 RX ADMIN — METOPROLOL TARTRATE 50 MG: 50 TABLET, FILM COATED ORAL at 08:02

## 2020-03-29 RX ADMIN — LOSARTAN POTASSIUM 100 MG: 50 TABLET, FILM COATED ORAL at 08:02

## 2020-03-29 RX ADMIN — DILTIAZEM HYDROCHLORIDE 60 MG: 30 TABLET, FILM COATED ORAL at 08:02

## 2020-03-29 NOTE — PROGRESS NOTES
Bedside, Verbal and Written shift change report given to Charline (oncoming nurse) by Rigo (offgoing nurse). Report included the following information SBAR, Kardex, ED Summary, OR Summary, Procedure Summary, Intake/Output, MAR and Accordion.

## 2020-03-29 NOTE — PROGRESS NOTES
Hospital to [facility] SBAR Handoff - Doris TORRES [address]                                                                        70 y.o.   female    [address]   Room: [facility] 3 NEUROLOGY TELE  Unit Phone# :  [address]  [facility] [address] [facility]  [place] [address]  Dept: [phone]  Loc: [phone]                    SITUATION        BACKGROUND     Allergies:  No Known Allergies    Past Medical History:   Diagnosis Date    Diabetes ([address]     Hypertension        No past surgical history on file. Medications Prior to Admission   Medication Sig    pravastatin (PRAVACHOL) 20 mg tablet Take 20 mg by mouth nightly.  diltiazem CD (CARTIA XT) 240 mg ER capsule Take 240 mg by mouth daily.  metFORMIN (GLUCOPHAGE) 500 mg tablet Take 500 mg by mouth two (2) times daily (with meals).  losartan (COZAAR) 100 mg tablet Take 100 mg by mouth daily.  metoprolol tartrate (LOPRESSOR) 50 mg tablet Take 50 mg by mouth two (2) times a day.  ergocalciferol (VITAMIN D2) 50,000 unit capsule Take 50,000 Units by mouth every month.  diazepam (VALIUM) 2 mg tablet Take 1 Tab by mouth every eight (8) hours as needed (muscle spasm). Max Daily Amount: 6 mg. Hard scripts included in transfer packet yes    Vaccinations:    Immunization History   Administered Date(s) Administered    Influenza Vaccine (Quad) PF 03/28/2020       Readmission Risks:    Known Risks: fall        The [employer] Index tool is an evidenced based tool that has more automatic generated information. The tool looks at many different items such as the age of the patient, how many times they were admitted in the last calendar year, current length of stay in the hospital and their diagnosis.  All of these items are pulled automatically from information documented in the chart from various places and will generate a score that predicts whether a patient is at low (less than 13), medium (13-20) or high (21 or greater) risk of being readmitted.         ASSESSMENT                Temp: 98.3 °F (36.8 °C) (03/29/20 0752) Pulse (Heart Rate): 81 (03/29/20 0752)     Resp Rate: 16 (03/29/20 0752)           BP: 146/77 (03/29/20 0752)     O2 Sat (%): 97 % (03/29/20 0752)     Weight: 71 kg (156 lb 8.4 oz)    Height: 5' 2\" (157.5 cm) (03/25/20 1227)       If above not within 1 hour of discharge:    BP:_____  P:____  R:____ T:_____ O2 Sat: ___%  O2: ______    Active Orders   Diet    DIET DYSPHAGIA PUREED (NDD1)  2 Poneto/2 Mildly Thick         Orientation: disoriented     Active Behaviors: None                                   Active Lines/Drains:  (Peg Tube / Pal / CL or S/L?): no    Urinary Status: Voiding, External catheter     Last BM: Last Bowel Movement Date: 03/28/20     Skin Integrity: Excoriation             Mobility: Slightly limited   Weight Bearing Status: WBAT (Weight Bearing as Tolerated)      Gait Training  Assistive Device: Gait belt, Walker, rolling  Ambulation - Level of Assistance: Minimal assistance, Assist x1  Distance (ft): 15 Feet (ft)         Lab Results   Component Value Date/Time    Glucose 104 (H) 03/28/2020 12:57 AM    Hemoglobin A1c 5.6 03/26/2020 03:27 AM    INR 1.0 03/25/2020 07:20 AM    INR 1.0 10/03/2009 01:52 PM    HGB 13.4 03/28/2020 12:57 AM    HGB 13.8 03/27/2020 12:28 AM        RECOMMENDATION     See After Visit Summary (AVS) for:  · Discharge instructions  · After [address]   · Special equipment needed (entered pre-discharge by Care Management)  · Medication Reconciliation    · Follow up Appointment(s)         Report given/sent by:  Mercedes Olmedo                    Verbal report given to: Hector Allison  FAXED to:  [facility]       Estimated discharge time:  3/29/2020 at 0900

## 2020-03-29 NOTE — DISCHARGE INSTRUCTIONS
HOSPITALIST DISCHARGE INSTRUCTIONS    NAME: Gwendolyn Echeverria   :  1940   MRN:  [id number]     Date/Time:  3/29/2020 10:22 AM    ADMIT DATE: 3/25/2020   DISCHARGE DATE: 3/29/2020     Attending Physician: Michael Huber NP    DISCHARGE DIAGNOSIS:  Patient Active Problem List   Diagnosis Code    Aphasia R47.01    Bilateral carotid artery stenosis I65.23    Acute CVA (cerebrovascular accident) ([facility] [address] I63.9    Paroxysmal A-fib ([facility] [address] I48.0    Hypercoagulability due to atrial fibrillation (HCC) D68.69, I48.91    HTN (hypertension) I10    HLD (hyperlipidemia) E78.5    Acute encephalopathy G93.40    Prediabetes R73.03     Medications: Per above medication reconciliation. Pain Management: per above medications    Recommended diet: Diabetic Diet    Recommended activity: PT/OT Eval and Treat    Wound care: None    Indwelling devices:  None    Supplemental Oxygen: None    Required Lab work: Per SNF routine    Glucose management:  Accucheck ACHS with sliding scale per SNF protocol    Code status: Full        Outside physician follow up: Follow-up Information     Follow up With Specialties Details Why Contact Info    Miguelito Simmons MD Neurology   [facility] [address]      Nancy Parekr MD Neurology Schedule an appointment as soon as possible for a visit  [facility]  [phone]                 Skilled nursing facility/ SNF MD responsible for above on discharge. Information obtained by :  I understand that if any problems occur once I am at home I am to contact my physician. I understand and acknowledge receipt of the instructions indicated above.                                                                                                                                            Physician's or R.N.'s Signature                                                                  Date/Time Patient or Repres

## 2020-03-30 ENCOUNTER — TELEPHONE (OUTPATIENT)
Dept: NEUROLOGY | Age: 80
End: 2020-03-30

## 2020-03-30 NOTE — TELEPHONE ENCOUNTER
attempted to call patient to schedule hospital follow up and mail box full for her and emergency contact

## 2020-09-14 ENCOUNTER — OFFICE VISIT (OUTPATIENT)
Dept: NEUROLOGY | Facility: CLINIC | Age: 80
End: 2020-09-14
Payer: MEDICARE

## 2020-09-14 VITALS
WEIGHT: 141 LBS | RESPIRATION RATE: 18 BRPM | SYSTOLIC BLOOD PRESSURE: 132 MMHG | BODY MASS INDEX: 25.79 KG/M2 | DIASTOLIC BLOOD PRESSURE: 82 MMHG | HEART RATE: 92 BPM | OXYGEN SATURATION: 99 %

## 2020-09-14 DIAGNOSIS — Z86.73 HISTORY OF CVA (CEREBROVASCULAR ACCIDENT): Primary | ICD-10-CM

## 2020-09-14 PROCEDURE — 99214 OFFICE O/P EST MOD 30 MIN: CPT | Performed by: PSYCHIATRY & NEUROLOGY

## 2020-09-14 RX ORDER — MULTIVITAMIN
1 CAPSULE ORAL DAILY
COMMUNITY

## 2020-09-14 RX ORDER — SERTRALINE HYDROCHLORIDE 25 MG/1
25 TABLET, FILM COATED ORAL DAILY
Qty: 30 TAB | Refills: 2 | Status: SHIPPED | OUTPATIENT
Start: 2020-09-14 | End: 2020-12-11

## 2020-09-14 NOTE — PROGRESS NOTES
[facility]   NEW PATIENT EVALUATION/CONSULTATION       PATIENT NAME: Maria Esther Emmanuel    MRN: [id number]    REASON FOR CONSULTATION: Hospital follow-up from [facility] March 2020 for ischemic cerebral infarct    09/14/20    HISTORY OF PRESENT ILLNESS:  Maria Esther Emmanuel is a 68 y.o. right-hand-dominant female with multiple vascular risk factors including hypertension, diabetes and history of tobacco dependence who was admitted to [facility] in March 2020 for management of ischemic cerebral infarct with symptoms of dysarthric speech and left hemiparesis. During her evaluation there, she was noted to have marketed microvascular white matter disease prior subcortical infarcts and an acute right posterior corona radiata infarct. Though she was noted to have atrial fibrillation patient was discharged on full dose aspirin as well as adjustment of her anti-lipid management strategy. She was then discharged home where she completed therapy for some time and has been since released. Presents for delayed follow-up today presumptively delayed due to coronavirus. In the interval from last medical care, patient has ongoing left-sided weakness as well as dysarthria for which daughter says she is feels she could benefit from therapy particularly as this weakness has been complicated by occasional falls without significant injury. Speech has plateaued remains intelligible though challenging at times she is able to eat certain foods including thin liquids choking noted. Patient had stopped smoking in February and has not resumed since. In addition to her motoric deficits, daughter notes unprovoked crying with patient admitting to feelings of frustration and depressive symptoms since her stroke. Is compliant with her medication regimen though daughter and patient are somewhat unclear regarding her documented history of atrial fibrillation.   No events concerning for recurrent stroke have been noted. PAST MEDICAL HISTORY:  Past Medical History:   Diagnosis Date    Diabetes ([address]     Hypertension        PAST SURGICAL HISTORY:  No significant past surgical history is noted    FAMILY HISTORY:   No significant past family history is noted    SOCIAL HISTORY:  Social History     Socioeconomic History    Marital status: LEGALLY      Spouse name: Not on file    Number of children: Not on file    Years of education: Not on file    Highest education level: Not on file   Tobacco Use    Smoking status: Passive Smoke Exposure - Never Smoker    Smokeless tobacco: Never Used   Substance and Sexual Activity    Alcohol use: No    Drug use: No         MEDICATIONS:   Current Outpatient Medications   Medication Sig Dispense Refill    multivitamin capsule Take 1 Cap by mouth daily.  sertraline (ZOLOFT) 25 mg tablet Take 1 Tab by mouth daily. 30 Tab 2    aspirin (ASPIRIN) 325 mg tablet Take 1 Tab by mouth daily. 30 Tab 0    atorvastatin (LIPITOR) 40 mg tablet Take 1 Tab by mouth nightly. 30 Tab 0    diltiazem CD (CARTIA XT) 240 mg ER capsule Take 240 mg by mouth daily.  metFORMIN (GLUCOPHAGE) 500 mg tablet Take 500 mg by mouth two (2) times daily (with meals).  losartan (COZAAR) 100 mg tablet Take 100 mg by mouth daily.  metoprolol tartrate (LOPRESSOR) 50 mg tablet Take 50 mg by mouth two (2) times a day.  diazepam (VALIUM) 2 mg tablet Take 1 Tab by mouth every eight (8) hours as needed (muscle spasm). Max Daily Amount: 6 mg. 20 Tab 0    ergocalciferol (VITAMIN D2) 50,000 unit capsule Take 50,000 Units by mouth every month. ALLERGIES:  No Known Allergies      REVIEW OF SYSTEMS:  10 point ROS reviewed with patient. Please see scanned document under media.        PHYSICAL EXAM:  Vital Signs:   Visit Vitals  /82   Pulse 92   Resp 18   Wt 64 kg (141 lb)   SpO2 99%   BMI 25.79 kg/m²     Physical examination:  Elderly female appearing stated age, sitting upright in chair. HEENT is largely unremarkable, other than patient's edentulous state. Neck is supple. Cardiovascular demonstrates constant S1/S2 regular rhythm. Pulmonary demonstrates equal entry bilaterally. Abdomen is nondistended/nontender. Extremities are warm/dry. Neurologically patient appears alert and oriented though this is not formally assessed. Attention appears overall intact to casual conversation. Speech is markedly dysarthric and difficult to understand at times. Language is intermittently fluent does difficulty following commands remainder of language is not assessed. Cranials 2 through 12 appear grossly unremarkable. On motor exam this is somewhat challenging due to comprehensive deficit. However patient has at least 4+ out of 5 strength in right upper extremity and 5 out of 5 in right lower extremity left deltoid appears to be 4 out of 5, biceps is 5- out of 5, triceps is 4+ out of 5, wrist extension is 4-5 finger extension 4- out of 5. Lower extremity iliopsoas is 4 out of 5, hip abduction abduction is 4+ out extension is 5 out of 5, knee flexion is 4+ out of 5 and foot dorsiflexion is 4+ out of 5. Sensation appears grossly intact to fine touch in upper and lower extremities. Coordination is intact in upper extremities. Reflexes are deferred. On gait testing, patient walks with a waddling opponent suggestive of weakness in the left proximal lower extremity and rarely stumbles to the left. PERTINENT DATA:  LDL 73 in March, hemoglobin A1c not obtained     CT Results (maximum last 3): Results from [place] encounter on 03/25/20   CT HEAD WO CONT    Narrative EXAM: CT HEAD WO CONT    INDICATION: mental status change    COMPARISON: March 25. CONTRAST: None. TECHNIQUE: Unenhanced CT of the head was performed using 5 mm images. Brain and  bone windows were generated.   CT dose reduction was achieved through use of a  standardized protocol tailored for this examination and automatic exposure  control for dose modulation. FINDINGS:  There is no extra-axial fluid collection hemorrhage or shift . remote infarcts and white matter disease, ventricular size, are stable  findings. Impression IMPRESSION: No acute changes. CT CODE NEURO PERF W CBF    Narrative Clinical history: CODE S  INDICATION:   CODE S slurred speech, left-sided facial droop    COMPARISON:  3/25/2020  TECHNIQUE:    CT dose reduction was achieved through use of a standardized protocol tailored  for this examination and automatic exposure control for dose modulation. Following the uneventful administration of Isovue-370 contrast material, axial  CT angiography of the head and neck was performed. Coronal and sagittal  reconstructions were obtained. 3D MAXIMAL INTENSITY PROJECTION reconstructed imaging of the cranial vasculature  in both the coronal and sagittal plane was performed. . Reconstructions were created with color coding of axial time to peak, axial  blood volume, axial blood flow, axial mean transit time and axial T Max. FINDINGS:  There is centrilobular emphysematous change. Thyroid nodules on the right and on  the left. Aortic atherosclerotic change is mild. The left vertebral artery is  dominant with a hypoplastic right vertebral artery. Multilevel canal and  foraminal stenosis of the cervical spine. Periventricular and extensive scattered hypodensities in the cerebral white  matter consistent with severe chronic microvascular ischemic change. .  Basilar  ganglia calcifications. . . The basal cisterns are clear. .    CTA NECK:   . The origins of the innominate, left common carotid and left subclavian  arteries are normal.    There is  0%stenosis in the right internal carotid artery utilizing NASCET  criteria. There is  0%stenosis in the right internal carotid artery utilizing NASCET  criteria. CTA HEAD:  Mild atherosclerotic change of the cavernous carotid arteries. The left  vertebral artery is dominant. Posterior communicating artery on the right. There  are A1 segments bilaterally. M1 segments are patent on the right and on the  left. M2 segments are patent. P1 segments are patent. Proximal P2 segments are  patent as well. . Moderate to severe distal P2 segment stenosis on the left. Mild  A2 segment stenosis distally on the right. . The internal carotid, anterior  cerebral, and middle cerebral arteries are patent. Hypoplastic right vertebral  artery. . There is no aneurysm. .    CT PERFUSION: No evidence of perfusion mismatch. No evidence of reversible  ischemia. Impression IMPRESSION:  No flow limiting stenosis or intracranial aneurysm. No major vessel occlusion. Severe chronic microvascular ischemic change with small/moderate remote  infarction in the left frontal lobe. Thyroid hypodensities. Nonemergent outpatient thyroid ultrasound when clinically  feasible is suggested. CTA CODE NEURO HEAD AND NECK W CONT    Narrative Clinical history: Code Stroke  INDICATION:   Code Stroke    COMPARISON:  3/25/2020  TECHNIQUE:    CT dose reduction was achieved through use of a standardized protocol tailored  for this examination and automatic exposure control for dose modulation. Following the uneventful administration of Isovue-370 contrast material, axial  CT angiography of the head and neck was performed. Coronal and sagittal  reconstructions were obtained. 3D MAXIMAL INTENSITY PROJECTION reconstructed imaging of the cranial vasculature  in both the coronal and sagittal plane was performed. . Reconstructions were created with color coding of axial time to peak, axial  blood volume, axial blood flow, axial mean transit time and axial T Max. FINDINGS:  There is centrilobular emphysematous change. Thyroid nodules on the right and on  the left. Aortic atherosclerotic change is mild. The left vertebral artery is  dominant with a hypoplastic right vertebral artery. Multilevel canal and  foraminal stenosis of the cervical spine. Periventricular and extensive scattered hypodensities in the cerebral white  matter consistent with severe chronic microvascular ischemic change. .  Basilar  ganglia calcifications. . The gray white differentiation is maintained. The  basal cisterns are clear. The paranasal sinuses are clear. CTA NECK:   There is conventional three vessel arch anatomy. The origins of the innominate,  left common carotid and left subclavian arteries are normal.    There is  0%stenosis in the right internal carotid artery utilizing NASCET  criteria. There is  0%stenosis in the right internal carotid artery utilizing NASCET  criteria. CTA HEAD:  Mild atherosclerotic change of the cavernous carotid arteries. The left  vertebral artery is dominant. Posterior communicating artery on the right. There  are A1 segments bilaterally. M1 segments are patent on the right and on the  left. M2 segments are patent. P1 segments are patent. Proximal P2 segments are  patent as well. . Moderate to severe distal P2 segment stenosis on the left. Mild  A2 segment stenosis distally on the right. . The internal carotid, anterior  cerebral, and middle cerebral arteries are patent. Hypoplastic right vertebral  artery. . There is no aneurysm. There are no sizable posterior communicating  arteries. CT PERFUSION: No evidence of perfusion mismatch. No evidence of reversible  ischemia. Impression IMPRESSION:  No flow limiting stenosis or intracranial aneurysm. No major vessel occlusion. Severe chronic microvascular ischemic change with small/moderate remote  infarction in the left frontal lobe. Thyroid hypodensities. Nonemergent outpatient thyroid ultrasound when clinically  feasible is suggested. MRI Results (maximum last 3):   Results from [place] encounter on 03/25/20   MRI BRAIN WO CONT    Narrative INDICATION:   stroke    EXAMINATION:  MRI BRAIN WO CONTRAST    COMPARISON:  October 4, 2009 MRI and CTA March 25, 2020    TECHNIQUE:  Multiplanar multisequence acquisition without contrast of the brain. FINDINGS:      Ventricles:  Midline, no hydrocephalus. Brain Parenchyma/Brainstem:  Extensive chronic white matter disease throughout  the supratentorial brain and to a lesser degree in the cesilia. Several areas of  chronic infarction involving the bilateral corona radiata, corpus callosum,  right thalamus. Small area of acute infarction posterior right corona radiata. Intracranial Hemorrhage:  None. Basal Cisterns:  Normal.   Flow Voids:  Normal.  Additional Comments:  N/A. Impression IMPRESSION:  Small acute infarction posterior right corona radiata. Areas of chronic  infarction and confluent white matter disease as above.          ASSESSMENT:      Edmond Veloz is a 68year old RH dominant female with multiple vascular risk factors who presents for delayed hospital follow-up to [facility] in regards to prior admission to [facility] in March for ischemic cerebral infarction    PLAN:  Cerebrovascular accident:  Currently on aspirin 81mg, lipitor with stroke evaluation completed during hospitalization in March  However, not clear whether would be most beneficial for patient to be transitioned to anticoagulation given history of atrial fibrillation or at least added on to antithrombotic regimen  Will reach out to primary care to obtain better detail regarding history of atrial fibrillation, patient/family are somewhat limited historians  In meantime, will continue current regimen and refer back to physical and speech therapy given that time from infarct is < 1 year  Will start on sertaline 25mg daily for post-stroke depressive symptoms  Should maintain blood pressure < 140/90 at minimum, ideally < 130/80  Encouraged and supported ongoing tobacco cessation    Follow-up in three months    > 45 minutes were spent personally by me during this visit of which > 50% of the time was spent engaged in counseling and coordination of care     I have discussed the diagnosis with the patient and the intended plan as seen in the above orders. Patient is in agreement. The patient has received an after-visit summary and questions were answered concerning future plans. I have discussed medication side effects and warnings with the patient as well.     Leisa Araujo MD     Primary care provider:  Schuyler Sosa, Physician assistant   [address] [place] [address]

## 2020-09-14 NOTE — PATIENT INSTRUCTIONS
PRESCRIPTION REFILL POLICY [facility] Statement to Patients April 1, 2014 In an effort to ensure the large volume of patient prescription refills is processed in the most efficient and expeditious manner, we are asking our patients to assist us by calling your Pharmacy for all prescription refills, this will include also your  Mail Order Pharmacy. The pharmacy will contact our office electronically to continue the refill process. Please do not wait until the last minute to call your pharmacy. We need at least 48 hours (2days) to fill prescriptions. We also encourage you to call your pharmacy before going to  your prescription to make sure it is ready. With regard to controlled substance prescription refill requests (narcotic refills) that need to be picked up at our office, we ask your cooperation by providing us with at least 72 hours (3days) notice that you will need a refill. We will not refill narcotic prescription refill requests after 4:00pm on any weekday, Monday through Thursday, or after 2:00pm on Fridays, or on the weekends. We encourage everyone to explore another way of getting your prescription refill request processed using [employer] our patient web portal through our electronic medical record system. [employer] is an efficient and effective way to communicate your medication request directly to the office and  downloadable as an john on your smart phone . [employer] also features a review functionality that allows you to view your medication list as well as leave messages for your physician. Are you ready to get connected? If so please review the attatched instructions or speak to any of our staff to get you set up right away! Thank you so much for your cooperation. Should you have any questions please contact our Practice Administrator. The Physicians and Staff,  [facility]

## 2020-10-31 ENCOUNTER — APPOINTMENT (OUTPATIENT)
Dept: GENERAL RADIOLOGY | Age: 80
End: 2020-10-31
Attending: PHYSICIAN ASSISTANT
Payer: MEDICARE

## 2020-10-31 ENCOUNTER — HOSPITAL ENCOUNTER (EMERGENCY)
Age: 80
Discharge: HOME | End: 2020-10-31
Attending: EMERGENCY MEDICINE
Payer: MEDICARE

## 2020-10-31 VITALS
BODY MASS INDEX: 26.43 KG/M2 | HEART RATE: 70 BPM | WEIGHT: 140 LBS | DIASTOLIC BLOOD PRESSURE: 80 MMHG | TEMPERATURE: 97.5 F | OXYGEN SATURATION: 100 % | RESPIRATION RATE: 12 BRPM | HEIGHT: 61 IN | SYSTOLIC BLOOD PRESSURE: 150 MMHG

## 2020-10-31 DIAGNOSIS — M54.12 CERVICAL RADICULOPATHY: ICD-10-CM

## 2020-10-31 DIAGNOSIS — M25.511 PAIN IN JOINT OF RIGHT SHOULDER: ICD-10-CM

## 2020-10-31 DIAGNOSIS — M48.02 CERVICAL STENOSIS OF SPINE: Primary | ICD-10-CM

## 2020-10-31 DIAGNOSIS — M50.30 DEGENERATIVE DISC DISEASE, CERVICAL: ICD-10-CM

## 2020-10-31 PROCEDURE — 99283 EMERGENCY DEPT VISIT LOW MDM: CPT

## 2020-10-31 PROCEDURE — 72050 X-RAY EXAM NECK SPINE 4/5VWS: CPT

## 2020-10-31 PROCEDURE — 73030 X-RAY EXAM OF SHOULDER: CPT

## 2020-10-31 PROCEDURE — 74011250637 HC RX REV CODE- 250/637: Performed by: PHYSICIAN ASSISTANT

## 2020-10-31 RX ORDER — ACETAMINOPHEN 325 MG/1
650 TABLET ORAL
Status: COMPLETED | OUTPATIENT
Start: 2020-10-31 | End: 2020-10-31

## 2020-10-31 RX ORDER — ACETAMINOPHEN 325 MG/1
650 TABLET ORAL
Qty: 20 TAB | Refills: 0 | Status: SHIPPED | OUTPATIENT
Start: 2020-10-31

## 2020-10-31 RX ADMIN — ACETAMINOPHEN 650 MG: 325 TABLET ORAL at 19:56

## 2020-11-01 NOTE — ED NOTES
Pt discharged home with written and verbal instructions given to patient and pt's daughter and pt to car via wheelchair.

## 2020-11-01 NOTE — DISCHARGE INSTRUCTIONS
Patient Education        Pinched Nerve in the Neck: Care Instructions  Your Care Instructions  A pinched nerve in the neck happens when a vertebra or disc in the upper part of your spine is damaged. This damage can happen because of an injury. Or it can just happen with age. The changes caused by the damage may put pressure on a nearby nerve root, pinching it. This causes symptoms such as sharp pain in your neck, shoulder, arm, hand, or back. You may also have tingling or numbness. Sometimes it makes your arm weaker. The symptoms are usually worse when you turn your head or strain your neck. For many people, the symptoms get better over time and finally go away. Early treatment usually includes medicines for pain and swelling. Sometimes physical therapy and special exercises may help. Follow-up care is a key part of your treatment and safety. Be sure to make and go to all appointments, and call your doctor if you are having problems. It's also a good idea to know your test results and keep a list of the medicines you take. How can you care for yourself at home? · Be safe with medicines. Read and follow all instructions on the label. ? If the doctor gave you a prescription medicine for pain, take it as prescribed. ? If you are not taking a prescription pain medicine, ask your doctor if you can take an over-the-counter medicine. · Try using a heating pad on a low or medium setting for 15 to 20 minutes every 2 or 3 hours. Try a warm shower in place of one session with the heating pad. You can also buy single-use heat wraps that last up to 8 hours. · You can also try an ice pack for 10 to 15 minutes every 2 to 3 hours. There isn't strong evidence that either heat or ice will help. But you can try them to see if they help you. · Don't spend too long in one position. Take short breaks to move around and change positions. · Wear a seat belt and shoulder harness when you are in a car.   · Sleep with a pillow under your head and neck that keeps your neck straight. · If you were given a neck brace (cervical collar) to limit neck motion, wear it as instructed for as many days as your doctor tells you to. Do not wear it longer than you were told to. Wearing a brace for too long can lead to neck stiffness and can weaken the neck muscles. · Follow your doctor's instructions for gentle neck-stretching exercises. · Do not smoke. Smoking can slow healing of your discs. If you need help quitting, talk to your doctor about stop-smoking programs and medicines. These can increase your chances of quitting for good. · Avoid strenuous work or exercise until your doctor says it is okay. When should you call for help? Call 911 anytime you think you may need emergency care. For example, call if:    · You are unable to move an arm or a leg at all. Call your doctor now or seek immediate medical care if:    · You have new or worse symptoms in your arms, legs, chest, belly, or buttocks. Symptoms may include:  ? Numbness or tingling. ? Weakness. ? Pain.     · You lose bladder or bowel control. Watch closely for changes in your health, and be sure to contact your doctor if:    · You are not getting better as expected. Where can you learn more? Go to http://www.gray.com/  Enter O897 in the search box to learn more about \"Pinched Nerve in the Neck: Care Instructions. \"  Current as of: March 2, 2020               Content Version: 12.6  © 0577-7258 [employer]. Care instructions adapted under license by [employer] (which disclaims liability or warranty for this information). If you have questions about a medical condition or this instruction, always ask your healthcare professional. [place] [address] any warranty or liability for your use of this information.

## 2020-11-01 NOTE — ED PROVIDER NOTES
EMERGENCY DEPARTMENT HISTORY AND PHYSICAL EXAM      Date: 10/31/2020  Patient Name: Lawrence Hyatt    History of Presenting Illness     Chief Complaint   Patient presents with    Arm Pain     Into triage via wheelchair with c/o non-traumatic RUE pain x months (several different places). Denies swelling. Slightly better with heat. History Provided By: Patient    HPI: Lawrence Hyatt, 68 y.o. female with medical history significant for hypertension, stroke and diabetes who presents via private vehicle with family member to the ED with cc of acute moderate aching right shoulder/right upper extremity pain X 2 weeks. No known injuries or trauma. Ibuprofen minimal relief. Denies fever, chills, nausea, vomiting, numbness, tingling, focal weakness, chest pain, shortness of breath, lightheadedness, dizziness, headache, syncope, seizure, swelling, rash, wound. No other medications or modifying factors. Endorses mild intermittent neck pain. PCP: Davida Ramos MD    There are no other complaints, changes, or physical findings at this time. No current facility-administered medications on file prior to encounter. Current Outpatient Medications on File Prior to Encounter   Medication Sig Dispense Refill    multivitamin capsule Take 1 Cap by mouth daily.  sertraline (ZOLOFT) 25 mg tablet Take 1 Tab by mouth daily. 30 Tab 2    aspirin (ASPIRIN) 325 mg tablet Take 1 Tab by mouth daily. 30 Tab 0    atorvastatin (LIPITOR) 40 mg tablet Take 1 Tab by mouth nightly. 30 Tab 0    diltiazem CD (CARTIA XT) 240 mg ER capsule Take 240 mg by mouth daily.  metFORMIN (GLUCOPHAGE) 500 mg tablet Take 500 mg by mouth two (2) times daily (with meals).  losartan (COZAAR) 100 mg tablet Take 100 mg by mouth daily.  metoprolol tartrate (LOPRESSOR) 50 mg tablet Take 50 mg by mouth two (2) times a day.  ergocalciferol (VITAMIN D2) 50,000 unit capsule Take 50,000 Units by mouth every month.       diazepam (VALIUM) 2 mg tablet Take 1 Tab by mouth every eight (8) hours as needed (muscle spasm). Max Daily Amount: 6 mg. 20 Tab 0     Past History     Past Medical History:  Past Medical History:   Diagnosis Date    Diabetes ([address]     Hypertension      Past Surgical History:  No past surgical history on file. Family History:  No family history on file. Social History:  Social History     Tobacco Use    Smoking status: Passive Smoke Exposure - Never Smoker    Smokeless tobacco: Never Used   Substance Use Topics    Alcohol use: No    Drug use: No     Allergies:  No Known Allergies  Review of Systems   Review of Systems   Constitutional: Negative for activity change, appetite change, chills, diaphoresis, fatigue and fever. HENT: Negative. Negative for congestion, tinnitus, trouble swallowing and voice change. Eyes: Negative. Negative for photophobia and visual disturbance. Respiratory: Negative. Negative for cough and shortness of breath. Cardiovascular: Negative. Negative for chest pain and leg swelling. Gastrointestinal: Negative. Negative for abdominal pain, diarrhea, nausea and vomiting. Genitourinary: Negative. Negative for dysuria. Musculoskeletal: Positive for arthralgias and neck pain. Negative for back pain, gait problem, joint swelling, myalgias and neck stiffness. Skin: Negative. Negative for color change, pallor and wound. Neurological: Negative. Negative for dizziness, seizures, syncope, facial asymmetry, speech difficulty, light-headedness, numbness and headaches. Psychiatric/Behavioral: Negative for confusion. The patient is not nervous/anxious. Physical Exam   Physical Exam  Vitals signs and nursing note reviewed. Constitutional:       General: She is not in acute distress. Appearance: She is well-developed. She is not diaphoretic. HENT:      Head: Normocephalic and atraumatic.       Right Ear: Hearing and external ear normal.      Left Ear: Hearing and external ear normal.      Nose: Nose normal.   Eyes:      Conjunctiva/sclera: Conjunctivae normal.      Pupils: Pupils are equal, round, and reactive to light. Neck:      Musculoskeletal: Full passive range of motion without pain and normal range of motion. Normal range of motion. No neck rigidity, crepitus, injury, pain with movement or muscular tenderness. Trachea: Trachea normal.   Cardiovascular:      Pulses:           Radial pulses are 2+ on the right side and 2+ on the left side. Pulmonary:      Effort: Pulmonary effort is normal. No respiratory distress. Musculoskeletal: Normal range of motion. Right shoulder: She exhibits tenderness, bony tenderness and pain. She exhibits normal range of motion, no swelling, no effusion, no crepitus, no deformity, no laceration, no spasm, normal pulse and normal strength. Right elbow: Normal.She exhibits normal range of motion, no swelling, no effusion, no deformity and no laceration. Cervical back: Normal.   Skin:     General: Skin is warm and dry. Neurological:      Mental Status: She is alert and oriented to person, place, and time. Psychiatric:         Behavior: Behavior normal.         Thought Content: Thought content normal.         Judgment: Judgment normal.       Diagnostic Study Results   Labs -   No results found for this or any previous visit (from the past 12 hour(s)). Radiologic Studies -   XR SHOULDER RT AP/LAT MIN 2 V   Final Result   IMPRESSION: No acute abnormality. XR SPINE CERV 4 OR 5 V   Final Result   IMPRESSION:     1. No acute abnormality. 2. Multilevel degenerative disc disease most advanced at C5-C6 and C6-C7, with   severe neural foraminal stenoses at C5-C6 and C6-C7. Xr Spine Cerv 4 Or 5 V    Result Date: 10/31/2020  IMPRESSION:  1. No acute abnormality. 2. Multilevel degenerative disc disease most advanced at C5-C6 and C6-C7, with severe neural foraminal stenoses at C5-C6 and C6-C7.      Xr Shoulder Rt Ap/lat Min 2 V    Result Date: 10/31/2020  IMPRESSION: No acute abnormality. Medical Decision Making   I am the first provider for this patient. I reviewed the vital signs, available nursing notes, past medical history, past surgical history, family history and social history. Vital Signs-Reviewed the patient's vital signs. Patient Vitals for the past 24 hrs:   Temp Pulse Resp BP SpO2   10/31/20 1808 97.5 °F (36.4 °C) 70 12 (!) 150/80 100 %     Pulse Oximetry Analysis - 100% on RA and normal    Records Reviewed: Nursing Notes, Old Medical Records, Previous Radiology Studies and Previous Laboratory Studies    Provider Notes (Medical Decision Making):   45-year-old female presents with nontraumatic right shoulder pain X 2 weeks. Differential includes sprain, strain, arthritis, tendinitis, neoplasm, radiculopathy. Will obtain imaging to rule out acute bony process and treat symptoms. Neurovascular intact. Will follow up with Ortho/PCP if x-ray stable. ED Course:   Initial assessment performed. The patients presenting problems have been discussed, and they are in agreement with the care plan formulated and outlined with them. I have encouraged them to ask questions as they arise throughout their visit. Progress Note:   Updated pt on all returned results and findings. Discussed the importance of proper follow up as referred below along with return precautions. Pt in agreement with the care plan and expresses agreement with and understanding of all items discussed. Disposition:  8:33 PM  I have discussed with patient their diagnosis, treatment, and follow up plan. The patient agrees to follow up as outlined in discharge paperwork and also to return to the ED with any worsening. Eloy Gray PA-C      PLAN:  1.    Current Discharge Medication List      START taking these medications    Details   acetaminophen (TYLENOL) 325 mg tablet Take 2 Tabs by mouth every four (4) hours as needed for Pain.  Qty: 20 Tab, Refills: 0         CONTINUE these medications which have NOT CHANGED    Details   multivitamin capsule Take 1 Cap by mouth daily. sertraline (ZOLOFT) 25 mg tablet Take 1 Tab by mouth daily. Qty: 30 Tab, Refills: 2      aspirin (ASPIRIN) 325 mg tablet Take 1 Tab by mouth daily. Qty: 30 Tab, Refills: 0      atorvastatin (LIPITOR) 40 mg tablet Take 1 Tab by mouth nightly. Qty: 30 Tab, Refills: 0      diltiazem CD (CARTIA XT) 240 mg ER capsule Take 240 mg by mouth daily. metFORMIN (GLUCOPHAGE) 500 mg tablet Take 500 mg by mouth two (2) times daily (with meals). losartan (COZAAR) 100 mg tablet Take 100 mg by mouth daily. metoprolol tartrate (LOPRESSOR) 50 mg tablet Take 50 mg by mouth two (2) times a day. ergocalciferol (VITAMIN D2) 50,000 unit capsule Take 50,000 Units by mouth every month. diazepam (VALIUM) 2 mg tablet Take 1 Tab by mouth every eight (8) hours as needed (muscle spasm). Max Daily Amount: 6 mg. Qty: 20 Tab, Refills: 0           2. Follow-up Information     Follow up With Specialties Details Why Contact Info    Bela Hernandes MD Neurology Schedule an appointment as soon as possible for a visit in 1 week As needed, If symptoms worsen Nathaly Mason [address]      [place], MD Neurosurgery Schedule an appointment as soon as possible for a visit in 1 week As needed, If symptoms worsen [address]      [facility]  Schedule an appointment as soon as possible for a visit in 1 week As needed, If symptoms worsen [address] [facility]  [address]  [address]  [phone]        Return to ED if worse     Diagnosis     Clinical Impression:   1. Cervical stenosis of spine    2. Degenerative disc disease, cervical    3. Cervical radiculopathy    4.  Pain in joint of right shoulder            Please note that this dictation was completed with Dragon, [employer] voice recognition software. Quite often unanticipated grammatical, syntax, homophones, and other interpretive errors are inadvertently transcribed by the computer software. Please disregard these errors. Additionally, please excuse any errors that have escaped final proofreading.

## 2020-12-11 RX ORDER — SERTRALINE HYDROCHLORIDE 25 MG/1
TABLET, FILM COATED ORAL
Qty: 30 TAB | Refills: 1 | Status: SHIPPED | OUTPATIENT
Start: 2020-12-11 | End: 2021-02-09

## 2021-02-09 RX ORDER — SERTRALINE HYDROCHLORIDE 25 MG/1
TABLET, FILM COATED ORAL
Qty: 30 TAB | Refills: 0 | Status: SHIPPED | OUTPATIENT
Start: 2021-02-09 | End: 2021-03-29

## 2021-03-18 ENCOUNTER — TELEPHONE (OUTPATIENT)
Dept: NEUROLOGY | Age: 81
End: 2021-03-18

## 2021-03-18 NOTE — TELEPHONE ENCOUNTER
Patient called, voicemail box is full, I attempted to locate the patient's daughter's phone number to call as well, but the phone number listed for the daughter is the same as the patient's phone number and I could not leave a message that the patient needs to be seen for a follow up to refill medication, will forward to Dr. Jordan Gambino for further instruction.

## 2021-03-18 NOTE — TELEPHONE ENCOUNTER
CRISTHIAN Goddard w/ [facility] calling stating patient and her daughter are there now and are requesting a refill on their Sertraline. He stated he is unsure as to why she is there and her daughter can't give much medical history. The daughter told him that Dr. Vincent is the one that prescribed it. He is calling to discuss patient in hopes to figure out why she is there. Please call

## 2021-03-18 NOTE — LETTER
3/19/2021 10:01 AM 
 
Ms. Alberto [facility] [id number] Ms. Tara Hussein Our office has made multiple attempts to reach you by phone. We were unable to leave voice messages as your voicemail stated it was full. Please contact our office at your earliest convenience to schedule a follow up to discuss you current medications. Sincerely, Liz Dumont MD

## 2021-03-19 ENCOUNTER — TELEPHONE (OUTPATIENT)
Dept: NEUROLOGY | Age: 81
End: 2021-03-19

## 2021-03-19 NOTE — TELEPHONE ENCOUNTER
----- Message from Castro Slater MD sent at 3/16/2021  8:35 AM EDT -----  Geni Santos    Received refill request for Sertaline for this patient. On review of chart I have filled it twice, patient has not been seen since September was supposed to followup in three months.  Will not be refilling medication any longer until patient comes in for follow-up, no virtual.    Camelia Duverney

## 2021-03-19 NOTE — TELEPHONE ENCOUNTER
Per Dr. Janeth Segura recommendations, letter mailed out to the patient as we were unable to reach her by phone as her voicemail is full, 3 attempts made to call the patient, no other phone numbers available to contact.

## 2021-03-29 RX ORDER — SERTRALINE HYDROCHLORIDE 25 MG/1
25 TABLET, FILM COATED ORAL DAILY
Qty: 30 TAB | Refills: 0 | Status: SHIPPED | OUTPATIENT
Start: 2021-03-29

## 2021-03-29 RX ORDER — SERTRALINE HYDROCHLORIDE 25 MG/1
TABLET, FILM COATED ORAL
Qty: 30 TAB | Refills: 3 | OUTPATIENT
Start: 2021-03-29

## 2021-03-29 NOTE — TELEPHONE ENCOUNTER
----- Message from Amee CunninghamCapac Page sent at 3/29/2021  9:19 AM EDT -----  Regarding: Dr. Daily Bergman Telephone  General Message/Vendor Calls    Caller's first and last name: Aaliyah Lopez-  Daughter      Reason for call: Medication Letter      Callback required yes/no and why: Yes.  To advise      Best contact number(s): [phone]      Details to clarify the request: Patient's daughter is contacting office in regards to a letter received about patient's medication      Jacklyn Nunn

## 2021-11-05 ENCOUNTER — APPOINTMENT (OUTPATIENT)
Dept: CT IMAGING | Age: 81
End: 2021-11-05
Attending: EMERGENCY MEDICINE
Payer: MEDICARE

## 2021-11-05 ENCOUNTER — HOSPITAL ENCOUNTER (EMERGENCY)
Age: 81
Discharge: HOME | End: 2021-11-05
Attending: EMERGENCY MEDICINE
Payer: MEDICARE

## 2021-11-05 VITALS
WEIGHT: 120 LBS | HEIGHT: 61 IN | DIASTOLIC BLOOD PRESSURE: 119 MMHG | BODY MASS INDEX: 22.66 KG/M2 | SYSTOLIC BLOOD PRESSURE: 148 MMHG | TEMPERATURE: 97.9 F | RESPIRATION RATE: 18 BRPM | HEART RATE: 74 BPM | OXYGEN SATURATION: 100 %

## 2021-11-05 DIAGNOSIS — S20.212A CONTUSION OF LEFT CHEST WALL, INITIAL ENCOUNTER: Primary | ICD-10-CM

## 2021-11-05 PROCEDURE — 71250 CT THORAX DX C-: CPT

## 2021-11-05 PROCEDURE — 74011250637 HC RX REV CODE- 250/637: Performed by: EMERGENCY MEDICINE

## 2021-11-05 PROCEDURE — 99283 EMERGENCY DEPT VISIT LOW MDM: CPT

## 2021-11-05 RX ORDER — NAPROXEN SODIUM 220 MG
440 TABLET ORAL
Qty: 20 TABLET | Refills: 0 | Status: SHIPPED | OUTPATIENT
Start: 2021-11-05

## 2021-11-05 RX ORDER — ACETAMINOPHEN 500 MG
1000 TABLET ORAL
Status: COMPLETED | OUTPATIENT
Start: 2021-11-05 | End: 2021-11-05

## 2021-11-05 RX ORDER — LIDOCAINE 50 MG/G
PATCH TOPICAL
Qty: 1 EACH | Refills: 0 | Status: SHIPPED | OUTPATIENT
Start: 2021-11-05

## 2021-11-05 RX ORDER — IBUPROFEN 600 MG/1
600 TABLET ORAL
Status: COMPLETED | OUTPATIENT
Start: 2021-11-05 | End: 2021-11-05

## 2021-11-05 RX ADMIN — ACETAMINOPHEN 1000 MG: 500 TABLET ORAL at 23:03

## 2021-11-05 RX ADMIN — IBUPROFEN 600 MG: 600 TABLET ORAL at 23:03

## 2021-11-06 NOTE — ED PROVIDER NOTES
EMERGENCY DEPARTMENT HISTORY AND PHYSICAL EXAM      Date: 11/5/2021  Patient Name: Benita Valente    Please note that this dictation was completed with [employer], the computer voice recognition software. Quite often unanticipated grammatical, syntax, homophones, and other interpretive errors are inadvertently transcribed by the computer software. Please disregard these errors. Please excuse any errors that have escaped final proofreading. History of Presenting Illness     Chief Complaint   Patient presents with   Everlene Anurag Fall     ED visit d.t fall - onset of sxs, yesterday 11/04/2021 1230 (noon) - slipped out of bed - hx of stroke - (L) flank pain s.p traumatic fall - Denies head trauma nor headache / neck trauma nor pain / change in mental status / fevers / N / V / D         History Provided By: Daughter    HPI: Benita Valente, 80 y.o. female, with a past medical history significant for dementia, prior stroke, hypertension presenting the emergency department with left-sided chest wall pain after a fall. Patient was given Tylenol earlier in the day with minimal relief of symptoms. Was not sleeping tonight secondary to the pain. Daughter states that she fell when she was trying to help her transition from a chair to her bed. Did not hit her head, did not lose consciousness. Patient is at baseline, no new focal neurologic deficit. Pain seems to be worse with movement per daughter. No other exacerbating relieving factors    PCP: Kaylin Guzman MD    No current facility-administered medications on file prior to encounter. Current Outpatient Medications on File Prior to Encounter   Medication Sig Dispense Refill    sertraline (ZOLOFT) 25 mg tablet Take 1 Tab by mouth daily. 30 Tab 0    acetaminophen (TYLENOL) 325 mg tablet Take 2 Tabs by mouth every four (4) hours as needed for Pain. 20 Tab 0    multivitamin capsule Take 1 Cap by mouth daily.       aspirin (ASPIRIN) 325 mg tablet Take 1 Tab by mouth daily. 30 Tab 0    atorvastatin (LIPITOR) 40 mg tablet Take 1 Tab by mouth nightly. 30 Tab 0    diltiazem CD (CARTIA XT) 240 mg ER capsule Take 240 mg by mouth daily.  metFORMIN (GLUCOPHAGE) 500 mg tablet Take 500 mg by mouth two (2) times daily (with meals).  losartan (COZAAR) 100 mg tablet Take 100 mg by mouth daily.  metoprolol tartrate (LOPRESSOR) 50 mg tablet Take 50 mg by mouth two (2) times a day.  ergocalciferol (VITAMIN D2) 50,000 unit capsule Take 50,000 Units by mouth every month.  diazepam (VALIUM) 2 mg tablet Take 1 Tab by mouth every eight (8) hours as needed (muscle spasm). Max Daily Amount: 6 mg. 20 Tab 0       Past History     Past Medical History:  Past Medical History:   Diagnosis Date    Diabetes ([address]     Hypertension        Past Surgical History:  No past surgical history on file. Family History:  No family history on file. Social History:  Social History     Tobacco Use    Smoking status: Passive Smoke Exposure - Never Smoker    Smokeless tobacco: Never Used   Substance Use Topics    Alcohol use: No    Drug use: No       Allergies:  No Known Allergies      Review of Systems   Review of Systems   Unable to perform ROS: Dementia       Physical Exam   Physical Exam  Vitals and nursing note reviewed. Constitutional:       Appearance: She is well-developed. HENT:      Head: Normocephalic and atraumatic. Eyes:      General:         Right eye: No discharge. Left eye: No discharge. Conjunctiva/sclera: Conjunctivae normal.      Pupils: Pupils are equal, round, and reactive to light. Neck:      Trachea: No tracheal deviation. Cardiovascular:      Rate and Rhythm: Normal rate and regular rhythm. Heart sounds: Normal heart sounds. No murmur heard. Pulmonary:      Effort: Pulmonary effort is normal. No respiratory distress. Breath sounds: Normal breath sounds. No wheezing or rales.    Chest:      Comments: Tenderness to palpation the left chest wall on the lateral aspect and posterior aspect. No midline thoracic tenderness. Abdominal:      General: Bowel sounds are normal.      Palpations: Abdomen is soft. Tenderness: There is no abdominal tenderness. There is no guarding or rebound. Musculoskeletal:         General: No tenderness or deformity. Normal range of motion. Cervical back: Normal range of motion and neck supple. Skin:     General: Skin is warm and dry. Findings: No erythema or rash. Neurological:      Mental Status: She is alert. Comments: Oriented to self only, moving all extremities, no focal deficit   Psychiatric:         Behavior: Behavior normal.         Diagnostic Study Results     Labs -   No results found for this or any previous visit (from the past 12 hour(s)). Radiologic Studies -   CT CHEST WO CONT   Final Result      1. No fracture or other acute traumatic injuries identified. .   2. Incidentals as above including coronary artery disease. CT Results  (Last 48 hours)               11/05/21 2311  CT CHEST WO CONT Final result    Impression:      1. No fracture or other acute traumatic injuries identified. .   2. Incidentals as above including coronary artery disease. Narrative:  INDICATION: Chest wall trauma. COMPARISON: Chest x-ray 3/25/2020. CONTRAST: None. TECHNIQUE:  5 mm axial images were obtained through the thorax. Coronal and   sagittal reformats were generated. CT dose reduction was achieved through use   of a standardized protocol tailored for this examination and automatic exposure   control for dose modulation. The absence of intravenous contrast reduces the sensitivity for evaluation of   the mediastinum, maria eugenia, vasculature, and upper abdominal organs. FINDINGS:       CHEST WALL: No mass or axillary lymphadenopathy. THYROID: There is dominant right thyroid nodule measuring 3.0 cm. MEDIASTINUM: No mass or lymphadenopathy.    MARIA EUGENIA: No mass or lymphadenopathy. THORACIC AORTA: Atherosclerotic calcification without aneurysm. MAIN PULMONARY ARTERY: Normal in caliber. TRACHEA/BRONCHI: Patent. ESOPHAGUS: No wall thickening or dilatation. HEART: The heart is normal in size without pericardial effusion. Coronary artery   calcifications are noted. PLEURA: No effusion or pneumothorax. LUNGS: No nodule, mass, or airspace disease. INCIDENTALLY IMAGED UPPER ABDOMEN: Hepatic cysts with no other significant   abnormality in the incidentally imaged upper abdomen. BONES: Degenerative spine change. No acute fracture or aggressive lesion. CXR Results  (Last 48 hours)    None            Medical Decision Making   I am the first provider for this patient. I reviewed the vital signs, available nursing notes, past medical history, past surgical history, family history and social history. Vital Signs-Reviewed the patient's vital signs. Patient Vitals for the past 12 hrs:   Temp Pulse Resp BP SpO2   11/05/21 2231 97.9 °F (36.6 °C) 74 18 (!) 148/119 100 %           Records Reviewed:   Nursing notes, Prior visits     Provider Notes (Medical Decision Making): Concerning for rib fractures, no clinical evidence of pneumothorax. No concern for head trauma. Will obtain CT of the chest to assess rib fracture, pneumothorax, hemothorax. Disposition pending imaging. If greater than 2 rib fractures, will plan on hospitalization given advanced age and risk of morbidity and mortality associated with rib fractures. ED Course:   Initial assessment performed. The patients presenting problems have been discussed, and they are in agreement with the care plan formulated and outlined with them. I have encouraged them to ask questions as they arise throughout their visit. Critical Care Time:   none    Disposition:    DISCHARGE NOTE  Patients results have been reviewed with them.   Patient and/or family have verbally conveyed their understanding and agreement of the patient's signs, symptoms, diagnosis, treatment and prognosis and additionally agree to follow up as recommended or return to the Emergency Room should their condition change or have any new concerns prior to their follow-up appointment. Patient verbally agrees with the care-plan and verbally conveys that all of their questions have been answered. Discharge instructions have also been provided to the patient with some educational information regarding their diagnosis as well a list of reasons why they would want to return to the ER prior to their follow-up appointment should their condition change. PLAN:  1. Discharge Medication List as of 11/5/2021 11:31 PM      START taking these medications    Details   naproxen sodium (Aleve) 220 mg tablet Take 2 Tablets by mouth every eight (8) hours as needed for Pain., Normal, Disp-20 Tablet, R-0      lidocaine (Lidoderm) 5 % Apply patch to the affected area for 12 hours a day and remove for 12 hours a day., Normal, Disp-1 Each, R-0         CONTINUE these medications which have NOT CHANGED    Details   sertraline (ZOLOFT) 25 mg tablet Take 1 Tab by mouth daily. , Normal, Disp-30 Tab, R-0      acetaminophen (TYLENOL) 325 mg tablet Take 2 Tabs by mouth every four (4) hours as needed for Pain., Normal, Disp-20 Tab,R-0      multivitamin capsule Take 1 Cap by mouth daily. , Historical Med      aspirin (ASPIRIN) 325 mg tablet Take 1 Tab by mouth daily. , Normal, Disp-30 Tab, R-0      atorvastatin (LIPITOR) 40 mg tablet Take 1 Tab by mouth nightly., Normal, Disp-30 Tab, R-0      diltiazem CD (CARTIA XT) 240 mg ER capsule Take 240 mg by mouth daily. , Historical Med      metFORMIN (GLUCOPHAGE) 500 mg tablet Take 500 mg by mouth two (2) times daily (with meals). , Historical Med      losartan (COZAAR) 100 mg tablet Take 100 mg by mouth daily. , Historical Med      metoprolol tartrate (LOPRESSOR) 50 mg tablet Take 50 mg by mouth two (2) times a day., Historical Med      ergocalciferol (VITAMIN D2) 50,000 unit capsule Take 50,000 Units by mouth every month., Historical Med      diazepam (VALIUM) 2 mg tablet Take 1 Tab by mouth every eight (8) hours as needed (muscle spasm). Max Daily Amount: 6 mg., Print, Disp-20 Tab, R-0           2. Follow-up Information     Follow up With Specialties Details Why Contact Info    Colt Moya MD Internal Medicine Schedule an appointment as soon as possible for a visit   [address] [place] [address] 809      \[facility]\"" EMERGENCY DEPT Emergency Medicine  If symptoms worsen [address] [place] [address] Drive  [address] [facility]  [phone]          Return to ED if worse     Diagnosis     Clinical Impression:   1. Contusion of left chest wall, initial encounter        Attestations:   This note was completed by Patria Schilling DO

## 2021-11-24 ENCOUNTER — HOSPITAL ENCOUNTER (EMERGENCY)
Age: 81
Discharge: HOME | End: 2021-11-24
Attending: EMERGENCY MEDICINE
Payer: MEDICARE

## 2021-11-24 ENCOUNTER — APPOINTMENT (OUTPATIENT)
Dept: GENERAL RADIOLOGY | Age: 81
End: 2021-11-24
Attending: EMERGENCY MEDICINE
Payer: MEDICARE

## 2021-11-24 ENCOUNTER — APPOINTMENT (OUTPATIENT)
Dept: CT IMAGING | Age: 81
End: 2021-11-24
Attending: EMERGENCY MEDICINE
Payer: MEDICARE

## 2021-11-24 VITALS
RESPIRATION RATE: 19 BRPM | SYSTOLIC BLOOD PRESSURE: 130 MMHG | DIASTOLIC BLOOD PRESSURE: 82 MMHG | TEMPERATURE: 97.7 F | HEART RATE: 80 BPM | OXYGEN SATURATION: 99 %

## 2021-11-24 DIAGNOSIS — N30.00 ACUTE CYSTITIS WITHOUT HEMATURIA: Primary | ICD-10-CM

## 2021-11-24 DIAGNOSIS — R40.4 TRANSIENT ALTERATION OF AWARENESS: ICD-10-CM

## 2021-11-24 LAB
ANION GAP SERPL CALC-SCNC: 9 MMOL/L (ref 5–15)
APPEARANCE UR: CLEAR
ATRIAL RATE: 91 BPM
BACTERIA URNS QL MICRO: NEGATIVE /HPF
BASOPHILS # BLD: 0 K/UL (ref 0–0.1)
BASOPHILS NFR BLD: 0 % (ref 0–1)
BILIRUB UR QL: NEGATIVE
BUN SERPL-MCNC: 17 MG/DL (ref 6–20)
BUN/CREAT SERPL: 20 (ref 12–20)
CALCIUM SERPL-MCNC: 10 MG/DL (ref 8.5–10.1)
CALCULATED P AXIS, ECG09: 77 DEGREES
CALCULATED R AXIS, ECG10: -19 DEGREES
CALCULATED T AXIS, ECG11: -20 DEGREES
CHLORIDE SERPL-SCNC: 110 MMOL/L (ref 97–108)
CO2 SERPL-SCNC: 22 MMOL/L (ref 21–32)
COLOR UR: ABNORMAL
CREAT SERPL-MCNC: 0.85 MG/DL (ref 0.55–1.02)
DIAGNOSIS, 93000: NORMAL
DIFFERENTIAL METHOD BLD: ABNORMAL
EOSINOPHIL # BLD: 0 K/UL (ref 0–0.4)
EOSINOPHIL NFR BLD: 0 % (ref 0–7)
EPITH CASTS URNS QL MICRO: ABNORMAL /LPF
ERYTHROCYTE [DISTWIDTH] IN BLOOD BY AUTOMATED COUNT: 16.9 % (ref 11.5–14.5)
GLUCOSE SERPL-MCNC: 122 MG/DL (ref 65–100)
GLUCOSE UR STRIP.AUTO-MCNC: NEGATIVE MG/DL
HCT VFR BLD AUTO: 38.8 % (ref 35–47)
HGB BLD-MCNC: 12.2 G/DL (ref 11.5–16)
HGB UR QL STRIP: ABNORMAL
HYALINE CASTS URNS QL MICRO: ABNORMAL /LPF (ref 0–5)
IMM GRANULOCYTES # BLD AUTO: 0 K/UL (ref 0–0.04)
IMM GRANULOCYTES NFR BLD AUTO: 0 % (ref 0–0.5)
INR PPP: 1 (ref 0.9–1.1)
KETONES UR QL STRIP.AUTO: NEGATIVE MG/DL
LEUKOCYTE ESTERASE UR QL STRIP.AUTO: NEGATIVE
LYMPHOCYTES # BLD: 0.5 K/UL (ref 0.8–3.5)
LYMPHOCYTES NFR BLD: 6 % (ref 12–49)
MAGNESIUM SERPL-MCNC: 2 MG/DL (ref 1.6–2.4)
MCH RBC QN AUTO: 29.2 PG (ref 26–34)
MCHC RBC AUTO-ENTMCNC: 31.4 G/DL (ref 30–36.5)
MCV RBC AUTO: 92.8 FL (ref 80–99)
MONOCYTES # BLD: 0.5 K/UL (ref 0–1)
MONOCYTES NFR BLD: 5 % (ref 5–13)
NEUTS SEG # BLD: 8.1 K/UL (ref 1.8–8)
NEUTS SEG NFR BLD: 89 % (ref 32–75)
NITRITE UR QL STRIP.AUTO: NEGATIVE
NRBC # BLD: 0 K/UL (ref 0–0.01)
NRBC BLD-RTO: 0 PER 100 WBC
PH UR STRIP: 6 [PH] (ref 5–8)
PLATELET # BLD AUTO: 251 K/UL (ref 150–400)
PMV BLD AUTO: 10.9 FL (ref 8.9–12.9)
POTASSIUM SERPL-SCNC: 3.3 MMOL/L (ref 3.5–5.1)
PROT UR STRIP-MCNC: 100 MG/DL
PROTHROMBIN TIME: 10.9 SEC (ref 9–11.1)
Q-T INTERVAL, ECG07: 400 MS
QRS DURATION, ECG06: 72 MS
QTC CALCULATION (BEZET), ECG08: 505 MS
RBC # BLD AUTO: 4.18 M/UL (ref 3.8–5.2)
RBC #/AREA URNS HPF: ABNORMAL /HPF (ref 0–5)
RBC MORPH BLD: ABNORMAL
SODIUM SERPL-SCNC: 141 MMOL/L (ref 136–145)
SP GR UR REFRACTOMETRY: 1.01 (ref 1–1.03)
TROPONIN-HIGH SENSITIVITY: 18 NG/L (ref 0–51)
TSH SERPL DL<=0.05 MIU/L-ACNC: 1.49 UIU/ML (ref 0.36–3.74)
UA: UC IF INDICATED,UAUC: ABNORMAL
UROBILINOGEN UR QL STRIP.AUTO: 1 EU/DL (ref 0.2–1)
VENTRICULAR RATE, ECG03: 96 BPM
WBC # BLD AUTO: 9.1 K/UL (ref 3.6–11)
WBC CASTS URNS QL MICRO: ABNORMAL /LPF
WBC URNS QL MICRO: ABNORMAL /HPF (ref 0–4)

## 2021-11-24 PROCEDURE — 87086 URINE CULTURE/COLONY COUNT: CPT

## 2021-11-24 PROCEDURE — 81001 URINALYSIS AUTO W/SCOPE: CPT

## 2021-11-24 PROCEDURE — 36415 COLL VENOUS BLD VENIPUNCTURE: CPT

## 2021-11-24 PROCEDURE — 84484 ASSAY OF TROPONIN QUANT: CPT

## 2021-11-24 PROCEDURE — 85610 PROTHROMBIN TIME: CPT

## 2021-11-24 PROCEDURE — 70450 CT HEAD/BRAIN W/O DYE: CPT

## 2021-11-24 PROCEDURE — 99284 EMERGENCY DEPT VISIT MOD MDM: CPT

## 2021-11-24 PROCEDURE — 83735 ASSAY OF MAGNESIUM: CPT

## 2021-11-24 PROCEDURE — 93005 ELECTROCARDIOGRAM TRACING: CPT

## 2021-11-24 PROCEDURE — 71045 X-RAY EXAM CHEST 1 VIEW: CPT

## 2021-11-24 PROCEDURE — 74011250637 HC RX REV CODE- 250/637: Performed by: EMERGENCY MEDICINE

## 2021-11-24 PROCEDURE — 80048 BASIC METABOLIC PNL TOTAL CA: CPT

## 2021-11-24 PROCEDURE — 85025 COMPLETE CBC W/AUTO DIFF WBC: CPT

## 2021-11-24 PROCEDURE — 84443 ASSAY THYROID STIM HORMONE: CPT

## 2021-11-24 RX ORDER — CEPHALEXIN 250 MG/1
500 CAPSULE ORAL
Status: COMPLETED | OUTPATIENT
Start: 2021-11-24 | End: 2021-11-24

## 2021-11-24 RX ORDER — CEPHALEXIN 500 MG/1
500 CAPSULE ORAL 3 TIMES DAILY
Qty: 15 CAPSULE | Refills: 0 | Status: SHIPPED | OUTPATIENT
Start: 2021-11-24 | End: 2022-02-14

## 2021-11-24 RX ADMIN — CEPHALEXIN 500 MG: 250 CAPSULE ORAL at 10:12

## 2021-11-24 NOTE — ED TRIAGE NOTES
Pt arrives via EMS after daughter reported increased changes in behavior such as hiding under the bed. Patient's daughter reported that she has dementia and a history of stroke with existing left side deficits that are not changed from her baseline.

## 2021-11-24 NOTE — ED PROVIDER NOTES
EMERGENCY DEPARTMENT HISTORY AND PHYSICAL EXAM      Date: 11/24/2021  Patient Name: Ida Hernandez    History of Presenting Illness     Chief Complaint   Patient presents with    Altered mental status       History Provided By: EMS    HPI: Ida Hernandez, 80 y.o. female presents to the ED with cc of AMS, moderate in severity with no alleviating or exacerbating factors. Patient brought to the emergency department from home by EMS. Per EMS family states the patient has been becoming progressively more altered over the last several days. She has been attempting to hide underneath the bed and has had other bizarre activities that they are unable to explain. This has been ongoing for a couple of days. Per EMS family denied any recent fever or chills. There have been no complaints of vomiting or diarrhea. Per family no recent cough or shortness of breath. Patient is unable to provide any information at this time    There are no other complaints, changes, or physical findings at this time. PCP: Marlon Guzman MD    No current facility-administered medications on file prior to encounter. Current Outpatient Medications on File Prior to Encounter   Medication Sig Dispense Refill    naproxen sodium (Aleve) 220 mg tablet Take 2 Tablets by mouth every eight (8) hours as needed for Pain. 20 Tablet 0    lidocaine (Lidoderm) 5 % Apply patch to the affected area for 12 hours a day and remove for 12 hours a day. 1 Each 0    sertraline (ZOLOFT) 25 mg tablet Take 1 Tab by mouth daily. 30 Tab 0    acetaminophen (TYLENOL) 325 mg tablet Take 2 Tabs by mouth every four (4) hours as needed for Pain. 20 Tab 0    multivitamin capsule Take 1 Cap by mouth daily.  aspirin (ASPIRIN) 325 mg tablet Take 1 Tab by mouth daily. 30 Tab 0    atorvastatin (LIPITOR) 40 mg tablet Take 1 Tab by mouth nightly. 30 Tab 0    diltiazem CD (CARTIA XT) 240 mg ER capsule Take 240 mg by mouth daily.       metFORMIN (GLUCOPHAGE) 500 mg tablet Take 500 mg by mouth two (2) times daily (with meals).  losartan (COZAAR) 100 mg tablet Take 100 mg by mouth daily.  metoprolol tartrate (LOPRESSOR) 50 mg tablet Take 50 mg by mouth two (2) times a day.  ergocalciferol (VITAMIN D2) 50,000 unit capsule Take 50,000 Units by mouth every month.  diazepam (VALIUM) 2 mg tablet Take 1 Tab by mouth every eight (8) hours as needed (muscle spasm). Max Daily Amount: 6 mg. 20 Tab 0       Past History     Past Medical History:  Past Medical History:   Diagnosis Date    Diabetes ([facility] [address]     Hypertension        Past Surgical History:  No past surgical history on file. Family History:  No family history on file. Social History:  Social History     Tobacco Use    Smoking status: Passive Smoke Exposure - Never Smoker    Smokeless tobacco: Never Used   Substance Use Topics    Alcohol use: No    Drug use: No       Allergies:  No Known Allergies      Review of Systems   Review of Systems   Unable to perform ROS: Dementia       Physical Exam   Physical Exam  Vitals and nursing note reviewed. Constitutional:       General: She is not in acute distress. Appearance: She is well-developed. She is not diaphoretic. Comments: Thin elderly female, no acute distress     HENT:      Head: Normocephalic and atraumatic. Mouth/Throat:      Mouth: Mucous membranes are moist.      Pharynx: No oropharyngeal exudate. Eyes:      General: No scleral icterus. Extraocular Movements: Extraocular movements intact. Conjunctiva/sclera: Conjunctivae normal.      Pupils: Pupils are equal, round, and reactive to light. Neck:      Vascular: No JVD. Trachea: No tracheal deviation. Cardiovascular:      Rate and Rhythm: Normal rate and regular rhythm. Heart sounds: Normal heart sounds. No murmur heard. Pulmonary:      Effort: Pulmonary effort is normal. No respiratory distress. Breath sounds: Normal breath sounds. No stridor. No wheezing or rales. Abdominal:      General: There is no distension. Palpations: Abdomen is soft. Tenderness: There is no abdominal tenderness. There is no guarding or rebound. Musculoskeletal:         General: No tenderness. Normal range of motion. Cervical back: Normal range of motion and neck supple. Skin:     General: Skin is warm and dry. Capillary Refill: Capillary refill takes less than 2 seconds. Neurological:      Mental Status: She is alert. Mental status is at baseline. She is confused. Cranial Nerves: No cranial nerve deficit. Comments: No gross motor or sensory deficits    Psychiatric:      Comments: Unable to assess           Diagnostic Study Results     Labs -  Recent Results (from the past 24 hour(s))   CBC WITH AUTOMATED DIFF    Collection Time: 11/24/21  7:26 AM   Result Value Ref Range    WBC 9.1 3.6 - 11.0 K/uL    RBC 4.18 3.80 - 5.20 M/uL    HGB 12.2 11.5 - 16.0 g/dL    HCT 38.8 35.0 - 47.0 %    MCV 92.8 80.0 - 99.0 FL    MCH 29.2 26.0 - 34.0 PG    MCHC 31.4 30.0 - 36.5 g/dL    RDW 16.9 (H) 11.5 - 14.5 %    PLATELET 520 600 - 683 K/uL    MPV 10.9 8.9 - 12.9 FL    NRBC 0.0 0  WBC    ABSOLUTE NRBC 0.00 0.00 - 0.01 K/uL    NEUTROPHILS 89 (H) 32 - 75 %    LYMPHOCYTES 6 (L) 12 - 49 %    MONOCYTES 5 5 - 13 %    EOSINOPHILS 0 0 - 7 %    BASOPHILS 0 0 - 1 %    IMMATURE GRANULOCYTES 0 0.0 - 0.5 %    ABS. NEUTROPHILS 8.1 (H) 1.8 - 8.0 K/UL    ABS. LYMPHOCYTES 0.5 (L) 0.8 - 3.5 K/UL    ABS. MONOCYTES 0.5 0.0 - 1.0 K/UL    ABS. EOSINOPHILS 0.0 0.0 - 0.4 K/UL    ABS. BASOPHILS 0.0 0.0 - 0.1 K/UL    ABS. IMM.  GRANS. 0.0 0.00 - 0.04 K/UL    DF SMEAR SCANNED      RBC COMMENTS ANISOCYTOSIS  1+       METABOLIC PANEL, BASIC    Collection Time: 11/24/21  7:26 AM   Result Value Ref Range    Sodium 141 136 - 145 mmol/L    Potassium 3.3 (L) 3.5 - 5.1 mmol/L    Chloride 110 (H) 97 - 108 mmol/L    CO2 22 21 - 32 mmol/L    Anion gap 9 5 - 15 mmol/L    Glucose 122 (H) 65 - 100 mg/dL    BUN 17 6 - 20 MG/DL    Creatinine 0.85 0.55 - 1.02 MG/DL    BUN/Creatinine ratio 20 12 - 20      GFR est AA >60 >60 ml/min/1.73m2    GFR est non-AA >60 >60 ml/min/1.73m2    Calcium 10.0 8.5 - 10.1 MG/DL   PROTHROMBIN TIME + INR    Collection Time: 11/24/21  7:26 AM   Result Value Ref Range    INR 1.0 0.9 - 1.1      Prothrombin time 10.9 9.0 - 11.1 sec   URINALYSIS W/ REFLEX CULTURE    Collection Time: 11/24/21  7:26 AM    Specimen: Urine   Result Value Ref Range    Color YELLOW/STRAW      Appearance CLEAR CLEAR      Specific gravity 1.011 1.003 - 1.030      pH (UA) 6.0 5.0 - 8.0      Protein 100 (A) NEG mg/dL    Glucose Negative NEG mg/dL    Ketone Negative NEG mg/dL    Bilirubin Negative NEG      Blood TRACE (A) NEG      Urobilinogen 1.0 0.2 - 1.0 EU/dL    Nitrites Negative NEG      Leukocyte Esterase Negative NEG      WBC 10-20 0 - 4 /hpf    RBC 0-5 0 - 5 /hpf    Epithelial cells FEW FEW /lpf    Bacteria Negative NEG /hpf    UA:UC IF INDICATED URINE CULTURE ORDERED (A) CNI      Hyaline cast 0-2 0 - 5 /lpf    WBC cast 0-2 (A) NEG /lpf   TROPONIN-HIGH SENSITIVITY    Collection Time: 11/24/21  7:26 AM   Result Value Ref Range    Troponin-High Sensitivity 18 0 - 51 ng/L   EKG, 12 LEAD, INITIAL    Collection Time: 11/24/21  7:30 AM   Result Value Ref Range    Ventricular Rate 96 BPM    Atrial Rate 91 BPM    QRS Duration 72 ms    Q-T Interval 400 ms    QTC Calculation (Bezet) 505 ms    Calculated P Axis 77 degrees    Calculated R Axis -19 degrees    Calculated T Axis -20 degrees    Diagnosis       Sinus rhythm  T wave abnormality, consider anterior ischemia  Prolonged QT  When compared with ECG of 27-MAR-2020 00:03,  Inverted T waves have replaced nonspecific T wave abnormality in Anterior   leads  QT has lengthened  Confirmed by Maximilian Murphy ([id number]) on 11/24/2021 3:33:55 PM     TSH 3RD GENERATION    Collection Time: 11/24/21  7:38 AM   Result Value Ref Range    TSH 1.49 0.36 - 3.74 uIU/mL   MAGNESIUM Collection Time: 11/24/21  7:38 AM   Result Value Ref Range    Magnesium 2.0 1.6 - 2.4 mg/dL          Radiologic Studies -   XR CHEST PORT   Final Result   No evidence of acute cardiopulmonary process. CT HEAD WO CONT   Final Result   No acute intracranial process. Unchanged diffuse periventricular white matter   disease, likely representing chronic small vessel ischemia. Old lacunar infarcts   in the basal ganglia. CT Results  (Last 48 hours)               11/24/21 0805  CT HEAD WO CONT Final result    Impression:  No acute intracranial process. Unchanged diffuse periventricular white matter   disease, likely representing chronic small vessel ischemia. Old lacunar infarcts   in the basal ganglia. Narrative:  EXAM: CT HEAD WO CONT       INDICATION: Altered mental status       COMPARISON: March 26, 2020. CONTRAST: None. TECHNIQUE: Unenhanced CT of the head was performed using 5 mm images. Brain and   bone windows were generated. Coronal and sagittal reformats. CT dose reduction   was achieved through use of a standardized protocol tailored for this   examination and automatic exposure control for dose modulation. FINDINGS:   The ventricles and sulci are normal in size, shape and configuration. . There is   unchanged diffuse periventricular white matter disease. Old lacunar infarcts are   noted in the basal ganglia. There is no intracranial hemorrhage, extra-axial   collection, or mass effect. The basilar cisterns are open. No CT evidence of   acute infarct. The bone windows demonstrate no abnormalities. The visualized portions of the   paranasal sinuses and mastoid air cells are clear. CXR Results  (Last 48 hours)    None          Medical Decision Making   I am the first provider for this patient. I reviewed the vital signs, available nursing notes, past medical history, past surgical history, family history and social history.     Vital Signs-Reviewed the patient's vital signs. Patient Vitals for the past 12 hrs:   Temp Pulse Resp BP SpO2   11/24/21 0725 97.7 °F (36.5 °C) 80 19 130/82 99 %       EKG interpretation: (Preliminary)  Sinus, rate 96, normal axis, normal pr/qrs, prolonged QTc, T wave inversion ant-lateral leads. T wave inversion is new when compared to previous, Ancelmo Oar, DO      Records Reviewed: Nursing Notes, Old Medical Records, Ambulance Run Sheet, Previous Radiology Studies and Previous Laboratory Studies, MRI 3/2020- acute infarct posterior right corona radiate, areas of chronic infarct, diffuse, last ED visit 11/5/21- chest wall contusion      Provider Notes (Medical Decision Making):   DDx- UTI, electrolyte abnormality, CVA affecting frontal lobe-subacute, progressive dementia     ED Course:   Initial assessment performed. The patients presenting problems have been discussed, and they are in agreement with the care plan formulated and outlined with them. I have encouraged them to ask questions as they arise throughout their visit. Pt with transient confusion. Likely UTI, pt given dose of Keflex here in ED will dc home. Discussed with daughter to f/u with PCP to discuss neuropsych testing given progressive dementia. Disposition:  Discharge home    DISCHARGE PLAN:  1. Discharge Medication List as of 11/24/2021 10:06 AM      START taking these medications    Details   cephALEXin (Keflex) 500 mg capsule Take 1 Capsule by mouth three (3) times daily. , Normal, Disp-15 Capsule, R-0         CONTINUE these medications which have NOT CHANGED    Details   naproxen sodium (Aleve) 220 mg tablet Take 2 Tablets by mouth every eight (8) hours as needed for Pain., Normal, Disp-20 Tablet, R-0      lidocaine (Lidoderm) 5 % Apply patch to the affected area for 12 hours a day and remove for 12 hours a day., Normal, Disp-1 Each, R-0      sertraline (ZOLOFT) 25 mg tablet Take 1 Tab by mouth daily. , Normal, Disp-30 Tab, R-0 acetaminophen (TYLENOL) 325 mg tablet Take 2 Tabs by mouth every four (4) hours as needed for Pain., Normal, Disp-20 Tab,R-0      multivitamin capsule Take 1 Cap by mouth daily. , Historical Med      aspirin (ASPIRIN) 325 mg tablet Take 1 Tab by mouth daily. , Normal, Disp-30 Tab, R-0      atorvastatin (LIPITOR) 40 mg tablet Take 1 Tab by mouth nightly., Normal, Disp-30 Tab, R-0      diltiazem CD (CARTIA XT) 240 mg ER capsule Take 240 mg by mouth daily. , Historical Med      metFORMIN (GLUCOPHAGE) 500 mg tablet Take 500 mg by mouth two (2) times daily (with meals). , Historical Med      losartan (COZAAR) 100 mg tablet Take 100 mg by mouth daily. , Historical Med      metoprolol tartrate (LOPRESSOR) 50 mg tablet Take 50 mg by mouth two (2) times a day., Historical Med      ergocalciferol (VITAMIN D2) 50,000 unit capsule Take 50,000 Units by mouth every month., Historical Med      diazepam (VALIUM) 2 mg tablet Take 1 Tab by mouth every eight (8) hours as needed (muscle spasm). Max Daily Amount: 6 mg., Print, Disp-20 Tab, R-0           2. Follow-up Information    None     PCP as needed     3. Return to ED if worse     Diagnosis     Clinical Impression:   1. Acute cystitis without hematuria    2. Transient alteration of awareness        Attestations:    Priscilla Mullen, DO    Please note that this dictation was completed with [employer], the computer voice recognition software. Quite often unanticipated grammatical, syntax, homophones, and other interpretive errors are inadvertently transcribed by the computer software. Please disregard these errors. Please excuse any errors that have escaped final proofreading. Thank you.

## 2021-11-25 LAB
BACTERIA SPEC CULT: NORMAL
SERVICE CMNT-IMP: NORMAL

## 2022-02-14 ENCOUNTER — OFFICE VISIT (OUTPATIENT)
Dept: NEUROLOGY | Age: 82
End: 2022-02-14
Payer: MEDICARE

## 2022-02-14 VITALS
OXYGEN SATURATION: 97 % | DIASTOLIC BLOOD PRESSURE: 72 MMHG | HEART RATE: 62 BPM | WEIGHT: 117 LBS | BODY MASS INDEX: 22.09 KG/M2 | HEIGHT: 61 IN | SYSTOLIC BLOOD PRESSURE: 110 MMHG

## 2022-02-14 DIAGNOSIS — I63.9 ACUTE CVA (CEREBROVASCULAR ACCIDENT) (HCC): Primary | ICD-10-CM

## 2022-02-14 PROCEDURE — G8400 PT W/DXA NO RESULTS DOC: HCPCS | Performed by: PSYCHIATRY & NEUROLOGY

## 2022-02-14 PROCEDURE — G8510 SCR DEP NEG, NO PLAN REQD: HCPCS | Performed by: PSYCHIATRY & NEUROLOGY

## 2022-02-14 PROCEDURE — G8752 SYS BP LESS 140: HCPCS | Performed by: PSYCHIATRY & NEUROLOGY

## 2022-02-14 PROCEDURE — G8420 CALC BMI NORM PARAMETERS: HCPCS | Performed by: PSYCHIATRY & NEUROLOGY

## 2022-02-14 PROCEDURE — 99213 OFFICE O/P EST LOW 20 MIN: CPT | Performed by: PSYCHIATRY & NEUROLOGY

## 2022-02-14 PROCEDURE — G8754 DIAS BP LESS 90: HCPCS | Performed by: PSYCHIATRY & NEUROLOGY

## 2022-02-14 PROCEDURE — 1101F PT FALLS ASSESS-DOCD LE1/YR: CPT | Performed by: PSYCHIATRY & NEUROLOGY

## 2022-02-14 PROCEDURE — G8427 DOCREV CUR MEDS BY ELIG CLIN: HCPCS | Performed by: PSYCHIATRY & NEUROLOGY

## 2022-02-14 PROCEDURE — G8536 NO DOC ELDER MAL SCRN: HCPCS | Performed by: PSYCHIATRY & NEUROLOGY

## 2022-02-14 PROCEDURE — 1090F PRES/ABSN URINE INCON ASSESS: CPT | Performed by: PSYCHIATRY & NEUROLOGY

## 2022-02-14 RX ORDER — MONTELUKAST SODIUM 10 MG/1
TABLET ORAL
COMMUNITY
Start: 2021-12-08 | End: 2022-06-06

## 2022-02-14 RX ORDER — CETIRIZINE HCL 10 MG
1 TABLET ORAL DAILY PRN
COMMUNITY
Start: 2021-08-29

## 2022-02-14 RX ORDER — METOPROLOL SUCCINATE 100 MG/1
100 TABLET, EXTENDED RELEASE ORAL DAILY
COMMUNITY
Start: 2022-02-03

## 2022-02-14 NOTE — PROGRESS NOTES
Chief Complaint   Patient presents with    Follow-up     history of stroke, here with daughter     Visit Vitals  /72 (BP 1 Location: Right upper arm, BP Patient Position: Sitting)   Pulse 62   Ht 5' 1\" (1.549 m)   Wt 117 lb (53.1 kg)   SpO2 97%   BMI 22.11 kg/m²

## 2022-02-14 NOTE — LETTER
1/47/3572    Patient: Martha Leary   YOB: 1940   Date of Visit: 2/14/2022     Dimitrios Obando MD  [address] [facility] [address]  Via Fax: [phone]    Dear Dimitrios Obando MD,      Thank you for referring Ms. Daniel Stewart to [address] [place] [address] for evaluation. My notes for this consultation are attached. If you have questions, please do not hesitate to call me. I look forward to following your patient along with you.       Sincerely,    Lashonda Bui MD

## 2022-02-14 NOTE — PROGRESS NOTES
Neurology Clinic Follow up Note    Patient ID:  Vonda Douglass  [id number]  80 y.o.  1940      Ms. Gurdeep Keita is here for follow up today of  Chief Complaint   Patient presents with    Follow-up     history of stroke, here with daughter          Last Appointment With Me:  Visit date not found       Interval History:     Interval from prior evaluation patient has been largely the same. Is off sertraline now due to adverse behavioral effects. Does sleep well, maintaining stable weight. Is resistant to exercise. No episodes concerning for recurrent stroke are noted. PMHx/ PSHx/ FHx/ SHx:  Reviewed and unchanged previous visit. ROS:  Comprehensive review of systems negative except for as noted above. Objective:       Meds:  Current Outpatient Medications   Medication Sig Dispense Refill    cetirizine (ZYRTEC) 10 mg tablet Take 1 Tablet by mouth daily as needed.  metoprolol succinate (TOPROL-XL) 100 mg tablet Take 100 mg by mouth daily.  montelukast (SINGULAIR) 10 mg tablet 1 tab(s)      naproxen sodium (Aleve) 220 mg tablet Take 2 Tablets by mouth every eight (8) hours as needed for Pain. 20 Tablet 0    lidocaine (Lidoderm) 5 % Apply patch to the affected area for 12 hours a day and remove for 12 hours a day. 1 Each 0    sertraline (ZOLOFT) 25 mg tablet Take 1 Tab by mouth daily. 30 Tab 0    acetaminophen (TYLENOL) 325 mg tablet Take 2 Tabs by mouth every four (4) hours as needed for Pain. 20 Tab 0    multivitamin capsule Take 1 Cap by mouth daily.  aspirin (ASPIRIN) 325 mg tablet Take 1 Tab by mouth daily. 30 Tab 0    atorvastatin (LIPITOR) 40 mg tablet Take 1 Tab by mouth nightly. 30 Tab 0    diltiazem CD (CARTIA XT) 240 mg ER capsule Take 240 mg by mouth daily.  metFORMIN (GLUCOPHAGE) 500 mg tablet Take 500 mg by mouth two (2) times daily (with meals).  losartan (COZAAR) 100 mg tablet Take 100 mg by mouth daily.       metoprolol tartrate (LOPRESSOR) 50 mg tablet Take 50 mg by mouth two (2) times a day.  ergocalciferol (VITAMIN D2) 50,000 unit capsule Take 50,000 Units by mouth every month.  diazepam (VALIUM) 2 mg tablet Take 1 Tab by mouth every eight (8) hours as needed (muscle spasm). Max Daily Amount: 6 mg. 20 Tab 0       Exam:  Visit Vitals  /72 (BP 1 Location: Right upper arm, BP Patient Position: Sitting)   Pulse 62   Ht 5' 1\" (1.549 m)   Wt 117 lb (53.1 kg)   SpO2 97%   BMI 22.11 kg/m²     Pleasant female resting interval in exam room in no distress. HEENT appears grossly unremarkable neck appears supple. Cardiopulmonary exams appear unremarkable. Abdomen is nondistended. Extremities are warm/dry. Neurologically, patient appears alert and orientation not assessed. Attention appears impaired by hearing. Speech is clear though mumbling at times language is nonfluent. Cranial nerves II through XII appear grossly unremarkable. Strength is 4+ out of 5 diffusely. Sensation appears grossly intact. Coordination is intact in upper extremities. Patient ambulates slowly but not ataxic using a walker.   Remainder examination is deferred  LABS  Results for orders placed or performed during the hospital encounter of 11/24/21   CULTURE, URINE    Specimen: Urine   Result Value Ref Range    Special Requests: NO SPECIAL REQUESTS  Reflexed from [id number]        Culture result: No growth (<1,000 CFU/ML)     CBC WITH AUTOMATED DIFF   Result Value Ref Range    WBC 9.1 3.6 - 11.0 K/uL    RBC 4.18 3.80 - 5.20 M/uL    HGB 12.2 11.5 - 16.0 g/dL    HCT 38.8 35.0 - 47.0 %    MCV 92.8 80.0 - 99.0 FL    MCH 29.2 26.0 - 34.0 PG    MCHC 31.4 30.0 - 36.5 g/dL    RDW 16.9 (H) 11.5 - 14.5 %    PLATELET 313 705 - 507 K/uL    MPV 10.9 8.9 - 12.9 FL    NRBC 0.0 0  WBC    ABSOLUTE NRBC 0.00 0.00 - 0.01 K/uL    NEUTROPHILS 89 (H) 32 - 75 %    LYMPHOCYTES 6 (L) 12 - 49 %    MONOCYTES 5 5 - 13 %    EOSINOPHILS 0 0 - 7 %    BASOPHILS 0 0 - 1 %    IMMATURE GRANULOCYTES 0 0.0 - 0.5 %    ABS. NEUTROPHILS 8.1 (H) 1.8 - 8.0 K/UL    ABS. LYMPHOCYTES 0.5 (L) 0.8 - 3.5 K/UL    ABS. MONOCYTES 0.5 0.0 - 1.0 K/UL    ABS. EOSINOPHILS 0.0 0.0 - 0.4 K/UL    ABS. BASOPHILS 0.0 0.0 - 0.1 K/UL    ABS. IMM.  GRANS. 0.0 0.00 - 0.04 K/UL    DF SMEAR SCANNED      RBC COMMENTS ANISOCYTOSIS  1+       METABOLIC PANEL, BASIC   Result Value Ref Range    Sodium 141 136 - 145 mmol/L    Potassium 3.3 (L) 3.5 - 5.1 mmol/L    Chloride 110 (H) 97 - 108 mmol/L    CO2 22 21 - 32 mmol/L    Anion gap 9 5 - 15 mmol/L    Glucose 122 (H) 65 - 100 mg/dL    BUN 17 6 - 20 MG/DL    Creatinine 0.85 0.55 - 1.02 MG/DL    BUN/Creatinine ratio 20 12 - 20      GFR est AA >60 >60 ml/min/1.73m2    GFR est non-AA >60 >60 ml/min/1.73m2    Calcium 10.0 8.5 - 10.1 MG/DL   PROTHROMBIN TIME + INR   Result Value Ref Range    INR 1.0 0.9 - 1.1      Prothrombin time 10.9 9.0 - 11.1 sec   URINALYSIS W/ REFLEX CULTURE    Specimen: Urine   Result Value Ref Range    Color YELLOW/STRAW      Appearance CLEAR CLEAR      Specific gravity 1.011 1.003 - 1.030      pH (UA) 6.0 5.0 - 8.0      Protein 100 (A) NEG mg/dL    Glucose Negative NEG mg/dL    Ketone Negative NEG mg/dL    Bilirubin Negative NEG      Blood TRACE (A) NEG      Urobilinogen 1.0 0.2 - 1.0 EU/dL    Nitrites Negative NEG      Leukocyte Esterase Negative NEG      WBC 10-20 0 - 4 /hpf    RBC 0-5 0 - 5 /hpf    Epithelial cells FEW FEW /lpf    Bacteria Negative NEG /hpf    UA:UC IF INDICATED URINE CULTURE ORDERED (A) CNI      Hyaline cast 0-2 0 - 5 /lpf    WBC cast 0-2 (A) NEG /lpf   TROPONIN-HIGH SENSITIVITY   Result Value Ref Range    Troponin-High Sensitivity 18 0 - 51 ng/L   TSH 3RD GENERATION   Result Value Ref Range    TSH 1.49 0.36 - 3.74 uIU/mL   MAGNESIUM   Result Value Ref Range    Magnesium 2.0 1.6 - 2.4 mg/dL   EKG, 12 LEAD, INITIAL   Result Value Ref Range    Ventricular Rate 96 BPM    Atrial Rate 91 BPM    QRS Duration 72 ms    Q-T Interval 400 ms    QTC Calculation (Bezet) 505 ms Calculated P Axis 77 degrees    Calculated R Axis -19 degrees    Calculated T Axis -20 degrees    Diagnosis       Sinus rhythm  T wave abnormality, consider anterior ischemia  Prolonged QT  When compared with ECG of 27-MAR-2020 00:03,  Inverted T waves have replaced nonspecific T wave abnormality in Anterior   leads  QT has lengthened  Confirmed by Nighat Staples ([id number]) on 11/24/2021 3:33:55 PM         IMAGING:  MRI Results (most recent):  Results from [place] encounter on 03/25/20    MRI BRAIN WO CONT    Narrative  INDICATION:   stroke    EXAMINATION:  MRI BRAIN WO CONTRAST    COMPARISON:  October 4, 2009 MRI and CTA March 25, 2020    TECHNIQUE:  Multiplanar multisequence acquisition without contrast of the brain. FINDINGS:    Ventricles:  Midline, no hydrocephalus. Brain Parenchyma/Brainstem:  Extensive chronic white matter disease throughout  the supratentorial brain and to a lesser degree in the cesilia. Several areas of  chronic infarction involving the bilateral corona radiata, corpus callosum,  right thalamus. Small area of acute infarction posterior right corona radiata. Intracranial Hemorrhage:  None. Basal Cisterns:  Normal.  Flow Voids:  Normal.  Additional Comments:  N/A. Impression  IMPRESSION:  Small acute infarction posterior right corona radiata. Areas of chronic  infarction and confluent white matter disease as above.           Assessment:     Hector Corbin is a 63-CHONG-MFA right-hand-dominant female presents to [facility] neurology clinic for delayed follow-up of prior stroke    Plan:   History of stroke:  No reports of recurrent stroke noted, patient is reported to be doing well  Daughter does not have any particular concerns, mention that patient can follow-up as needed  Would maintain patient on antithrombotic therapy, maintain cholesterol parameters to maintain total cholesterol less than 200, LDL less than 70  Will maintain A1c less than 7 at minimum if applicable, made pressure less than 140/90 at minimum, less than 130/80 ideally    Follow-up PRN    Signed:  Ford Rodriguez MD  2/14/2022  10:41 AM

## 2022-03-18 PROBLEM — I48.0 PAROXYSMAL A-FIB (HCC): Status: ACTIVE | Noted: 2020-03-28

## 2022-03-18 PROBLEM — E78.5 HLD (HYPERLIPIDEMIA): Status: ACTIVE | Noted: 2020-03-28

## 2022-03-18 PROBLEM — R47.01 APHASIA: Status: ACTIVE | Noted: 2020-03-25

## 2022-03-19 PROBLEM — G93.40 ACUTE ENCEPHALOPATHY: Status: ACTIVE | Noted: 2020-03-28

## 2022-03-19 PROBLEM — I10 HTN (HYPERTENSION): Status: ACTIVE | Noted: 2020-03-28

## 2022-03-19 PROBLEM — I48.91 HYPERCOAGULABILITY DUE TO ATRIAL FIBRILLATION (HCC): Status: ACTIVE | Noted: 2020-03-28

## 2022-03-19 PROBLEM — D68.69 HYPERCOAGULABILITY DUE TO ATRIAL FIBRILLATION (HCC): Status: ACTIVE | Noted: 2020-03-28

## 2022-03-19 PROBLEM — R73.03 PREDIABETES: Status: ACTIVE | Noted: 2020-03-28

## 2022-03-20 PROBLEM — I63.9 ACUTE CVA (CEREBROVASCULAR ACCIDENT) (HCC): Status: ACTIVE | Noted: 2020-03-28

## 2022-03-20 PROBLEM — I65.23 BILATERAL CAROTID ARTERY STENOSIS: Status: ACTIVE | Noted: 2020-03-25

## 2022-06-11 ENCOUNTER — HOSPITAL ENCOUNTER (EMERGENCY)
Age: 82
Discharge: HOME | End: 2022-06-11
Attending: EMERGENCY MEDICINE
Payer: MEDICARE

## 2022-06-11 VITALS
WEIGHT: 119.49 LBS | DIASTOLIC BLOOD PRESSURE: 75 MMHG | HEART RATE: 67 BPM | SYSTOLIC BLOOD PRESSURE: 121 MMHG | TEMPERATURE: 98.3 F | HEIGHT: 61 IN | RESPIRATION RATE: 16 BRPM | BODY MASS INDEX: 22.56 KG/M2 | OXYGEN SATURATION: 97 %

## 2022-06-11 DIAGNOSIS — J02.9 SORE THROAT: ICD-10-CM

## 2022-06-11 DIAGNOSIS — J34.89 RHINORRHEA: Primary | ICD-10-CM

## 2022-06-11 PROCEDURE — U0005 INFEC AGEN DETEC AMPLI PROBE: HCPCS

## 2022-06-11 PROCEDURE — 99283 EMERGENCY DEPT VISIT LOW MDM: CPT

## 2022-06-11 NOTE — ED PROVIDER NOTES
EMERGENCY DEPARTMENT HISTORY AND PHYSICAL EXAM      Date: 6/11/2022  Patient Name: Sheila Grimes  Patient Age and Sex: [de-identified] y.o. female     History of Presenting Illness     Chief Complaint   Patient presents with    Dysphagia     pt's daughter reports pt has had gradually increased difficulty swallowing over past month; still able to swallow food and water, but notices buildup of spit in mouth; s/p stroke 3 years ago which is when swallowing trouble initially started     History Provided By: Patient, daughter    HPI: Sheila Grimes is an [de-identified]year old presenting with runny nose and sore throat. Patient and daughter report she has had runny nose for the past several weeks, rhinorrhea is clear to white-colored, no purulent nature to the runny nose. Patient is also been complaining of recent sore throat, most recently complained of sore throat to daughter today. At time presentation patient denies any sore throat. She denies any somatic complaint. Daughter estimates that the patient has had some difficulty swallowing this has been persistent since her stroke in 2020, possibly worsening recently. She is able to tolerate oral solid and liquid intake without difficulty. No recent cough no headache no fever no myalgias no arthralgias. Patient without any somatic complaint at time presentation. There are no other complaints, changes, or physical findings at this time. PCP: Shirley Guzman MD    No current facility-administered medications on file prior to encounter. Current Outpatient Medications on File Prior to Encounter   Medication Sig Dispense Refill    cetirizine (ZYRTEC) 10 mg tablet Take 1 Tablet by mouth daily as needed.  metoprolol succinate (TOPROL-XL) 100 mg tablet Take 100 mg by mouth daily.  naproxen sodium (Aleve) 220 mg tablet Take 2 Tablets by mouth every eight (8) hours as needed for Pain.  20 Tablet 0    lidocaine (Lidoderm) 5 % Apply patch to the affected area for 12 hours a day and remove for 12 hours a day. 1 Each 0    sertraline (ZOLOFT) 25 mg tablet Take 1 Tab by mouth daily. 30 Tab 0    acetaminophen (TYLENOL) 325 mg tablet Take 2 Tabs by mouth every four (4) hours as needed for Pain. 20 Tab 0    multivitamin capsule Take 1 Cap by mouth daily.  aspirin (ASPIRIN) 325 mg tablet Take 1 Tab by mouth daily. 30 Tab 0    atorvastatin (LIPITOR) 40 mg tablet Take 1 Tab by mouth nightly. 30 Tab 0    diltiazem CD (CARTIA XT) 240 mg ER capsule Take 240 mg by mouth daily.  metFORMIN (GLUCOPHAGE) 500 mg tablet Take 500 mg by mouth two (2) times daily (with meals).  losartan (COZAAR) 100 mg tablet Take 100 mg by mouth daily.  metoprolol tartrate (LOPRESSOR) 50 mg tablet Take 50 mg by mouth two (2) times a day.  ergocalciferol (VITAMIN D2) 50,000 unit capsule Take 50,000 Units by mouth every month.  diazepam (VALIUM) 2 mg tablet Take 1 Tab by mouth every eight (8) hours as needed (muscle spasm). Max Daily Amount: 6 mg. 20 Tab 0       Past History     Past Medical History:  Past Medical History:   Diagnosis Date    Diabetes ([address]     Hypertension        Past Surgical History:  No past surgical history on file. Family History:  No family history on file. Social History:  Social History     Tobacco Use    Smoking status: Passive Smoke Exposure - Never Smoker    Smokeless tobacco: Never Used   Substance Use Topics    Alcohol use: No    Drug use: No       Allergies:  No Known Allergies      Review of Systems   Review of Systems   Constitutional: Negative for chills and fever. HENT: Positive for rhinorrhea, sore throat and trouble swallowing. Negative for congestion. Respiratory: Negative for shortness of breath. Cardiovascular: Negative for chest pain. Gastrointestinal: Negative for abdominal pain, nausea and vomiting. Genitourinary: Negative for dysuria and frequency. Musculoskeletal: Negative for myalgias.    All other systems reviewed and are negative. Physical Exam   Physical Exam  Vitals and nursing note reviewed. Constitutional:       General: She is not in acute distress. Appearance: Normal appearance. She is not ill-appearing. HENT:      Head: Normocephalic. Comments: No sinus tenderness on exam nasal turbinates are nonswollen     Nose: Nose normal. No congestion or rhinorrhea. Mouth/Throat:      Mouth: Mucous membranes are moist.      Pharynx: Oropharynx is clear. No oropharyngeal exudate or posterior oropharyngeal erythema. Eyes:      Conjunctiva/sclera: Conjunctivae normal.   Cardiovascular:      Rate and Rhythm: Normal rate and regular rhythm. Pulses: Normal pulses. Pulmonary:      Effort: Pulmonary effort is normal.      Breath sounds: Normal breath sounds. No stridor. No rhonchi. Abdominal:      General: Abdomen is flat. Palpations: Abdomen is soft. Musculoskeletal:         General: No deformity. Skin:     General: Skin is warm and dry. Neurological:      Mental Status: She is alert and oriented to person, place, and time. Mental status is at baseline. GCS: GCS eye subscore is 4. GCS verbal subscore is 5. GCS motor subscore is 6. Cranial Nerves: Cranial nerves are intact. Sensory: Sensation is intact. Motor: Weakness present. Coordination: Coordination is intact. Gait: Gait is intact. Comments: Subtle 4+/5 strength left arm left leg   Psychiatric:         Behavior: Behavior normal.         Thought Content: Thought content normal.          Diagnostic Study Results     Labs -   No results found for this or any previous visit (from the past 12 hour(s)). Radiologic Studies -   No orders to display     CT Results  (Last 48 hours)    None        CXR Results  (Last 48 hours)    None            Medical Decision Making   I am the first provider for this patient.     I reviewed the vital signs, available nursing notes, past medical history, past surgical history, family history and social history. Vital Signs-Reviewed the patient's vital signs. Patient Vitals for the past 12 hrs:   Temp Pulse Resp BP SpO2   06/11/22 1930 -- 67 -- 121/75 97 %   06/11/22 1844 98.3 °F (36.8 °C) 73 16 (!) 143/87 100 %       Records Reviewed: Nursing Notes and Old Medical Records    Provider Notes (Medical Decision Making):   Patient presenting with mild rhinorrhea sore throat without evidence of pharyngitis on exam, so acute or chronic presentation. No worrisome features to sore throat, no change to voice, no stridor. No purulence to rhinorrhea, patient is already doing Flonase and Claritin at home. She Appears Well, doubt bacterial sinusitis. Will test for COVID in setting of her upper respiratory symptoms. Regarding her difficulty swallowing, she would benefit from a speech and language pathology evaluation though she has no history of aspiration, chronic to subacute symptoms, patient is safe for outpatient SLP. Will discharge with his instructions and strict return precautions. ED Course:   Initial assessment performed. The patients presenting problems have been discussed, and they are in agreement with the care plan formulated and outlined with them. I have encouraged them to ask questions as they arise throughout their visit. Critical Care Time:   0    Disposition:  Discharge Note:  The patient has been re-evaluated and is ready for discharge. Reviewed available results with patient. Counseled patient on diagnosis and care plan. Patient has expressed understanding, and all questions have been answered. Patient agrees with plan and agrees to follow up as recommended, or to return to the ED if their symptoms worsen. Discharge instructions have been provided and explained to the patient, along with reasons to return to the ED. PLAN:  Discharge Medication List as of 6/11/2022  7:53 PM        2.    Follow-up Information     Follow up With Specialties Details Why Contact Info    Angel Luis Osman MD Internal Medicine Physician  Follow-up with PCP to discuss outpatient speech-language pathology evaluation [address] [facility]  [phone]      [facility] EMERGENCY DEPT Emergency Medicine  As needed, If symptoms worsen [address]  [address] [facility]  [phone]        3. Return to ED if worse     Diagnosis     Clinical Impression:   1. Rhinorrhea    2. Sore throat        Attestations:    Larry Dee M.D. Please note that this dictation was completed with [employer]oo, the [employer] voice recognition software. Quite often unanticipated grammatical, syntax, homophones, and other interpretive errors are inadvertently transcribed by the computer software. Please disregard these errors. Please excuse any errors that have escaped final proofreading. Thank you.

## 2022-06-12 LAB
SARS-COV-2, XPLCVT: NOT DETECTED
SOURCE, COVRS: NORMAL

## 2022-12-27 ENCOUNTER — APPOINTMENT (OUTPATIENT)
Dept: CT IMAGING | Age: 82
End: 2022-12-27
Attending: EMERGENCY MEDICINE
Payer: MEDICARE

## 2022-12-27 ENCOUNTER — HOSPITAL ENCOUNTER (EMERGENCY)
Age: 82
Discharge: HOME | End: 2022-12-27
Attending: EMERGENCY MEDICINE
Payer: MEDICARE

## 2022-12-27 VITALS
OXYGEN SATURATION: 99 % | TEMPERATURE: 97.4 F | DIASTOLIC BLOOD PRESSURE: 100 MMHG | SYSTOLIC BLOOD PRESSURE: 146 MMHG | RESPIRATION RATE: 17 BRPM | HEART RATE: 81 BPM

## 2022-12-27 DIAGNOSIS — R11.2 NAUSEA AND VOMITING, UNSPECIFIED VOMITING TYPE: ICD-10-CM

## 2022-12-27 DIAGNOSIS — R10.84 ABDOMINAL PAIN, GENERALIZED: Primary | ICD-10-CM

## 2022-12-27 LAB
ALBUMIN SERPL-MCNC: 3.9 G/DL (ref 3.5–5)
ALBUMIN/GLOB SERPL: 0.9 {RATIO} (ref 1.1–2.2)
ALP SERPL-CCNC: 89 U/L (ref 45–117)
ALT SERPL-CCNC: 28 U/L (ref 12–78)
ANION GAP SERPL CALC-SCNC: 8 MMOL/L (ref 5–15)
APPEARANCE UR: CLEAR
AST SERPL-CCNC: 16 U/L (ref 15–37)
BACTERIA URNS QL MICRO: NEGATIVE /HPF
BASOPHILS # BLD: 0 K/UL (ref 0–0.1)
BASOPHILS NFR BLD: 0 % (ref 0–1)
BILIRUB SERPL-MCNC: 0.5 MG/DL (ref 0.2–1)
BILIRUB UR QL: NEGATIVE
BUN SERPL-MCNC: 20 MG/DL (ref 6–20)
BUN/CREAT SERPL: 21 (ref 12–20)
CALCIUM SERPL-MCNC: 9.6 MG/DL (ref 8.5–10.1)
CHLORIDE SERPL-SCNC: 107 MMOL/L (ref 97–108)
CO2 SERPL-SCNC: 22 MMOL/L (ref 21–32)
COLOR UR: ABNORMAL
COVID-19 RAPID TEST, COVR: NOT DETECTED
CREAT SERPL-MCNC: 0.97 MG/DL (ref 0.55–1.02)
DIFFERENTIAL METHOD BLD: ABNORMAL
EOSINOPHIL # BLD: 0 K/UL (ref 0–0.4)
EOSINOPHIL NFR BLD: 0 % (ref 0–7)
EPITH CASTS URNS QL MICRO: ABNORMAL /LPF
ERYTHROCYTE [DISTWIDTH] IN BLOOD BY AUTOMATED COUNT: 15.8 % (ref 11.5–14.5)
FLUAV AG NPH QL IA: NEGATIVE
FLUBV AG NOSE QL IA: NEGATIVE
GLOBULIN SER CALC-MCNC: 4.3 G/DL (ref 2–4)
GLUCOSE SERPL-MCNC: 228 MG/DL (ref 65–100)
GLUCOSE UR STRIP.AUTO-MCNC: NEGATIVE MG/DL
HCT VFR BLD AUTO: 43.2 % (ref 35–47)
HGB BLD-MCNC: 13.7 G/DL (ref 11.5–16)
HGB UR QL STRIP: ABNORMAL
HYALINE CASTS URNS QL MICRO: ABNORMAL /LPF (ref 0–2)
IMM GRANULOCYTES # BLD AUTO: 0.1 K/UL (ref 0–0.04)
IMM GRANULOCYTES NFR BLD AUTO: 1 % (ref 0–0.5)
KETONES UR QL STRIP.AUTO: NEGATIVE MG/DL
LEUKOCYTE ESTERASE UR QL STRIP.AUTO: NEGATIVE
LIPASE SERPL-CCNC: 122 U/L (ref 73–393)
LYMPHOCYTES # BLD: 0.4 K/UL (ref 0.8–3.5)
LYMPHOCYTES NFR BLD: 5 % (ref 12–49)
MCH RBC QN AUTO: 30.2 PG (ref 26–34)
MCHC RBC AUTO-ENTMCNC: 31.7 G/DL (ref 30–36.5)
MCV RBC AUTO: 95.2 FL (ref 80–99)
MONOCYTES # BLD: 0.1 K/UL (ref 0–1)
MONOCYTES NFR BLD: 1 % (ref 5–13)
NEUTS SEG # BLD: 7.2 K/UL (ref 1.8–8)
NEUTS SEG NFR BLD: 93 % (ref 32–75)
NITRITE UR QL STRIP.AUTO: NEGATIVE
NRBC # BLD: 0 K/UL (ref 0–0.01)
NRBC BLD-RTO: 0 PER 100 WBC
PH UR STRIP: 6.5 [PH] (ref 5–8)
PLATELET # BLD AUTO: 273 K/UL (ref 150–400)
PMV BLD AUTO: 10.3 FL (ref 8.9–12.9)
POTASSIUM SERPL-SCNC: 4 MMOL/L (ref 3.5–5.1)
PROT SERPL-MCNC: 8.2 G/DL (ref 6.4–8.2)
PROT UR STRIP-MCNC: 30 MG/DL
RBC # BLD AUTO: 4.54 M/UL (ref 3.8–5.2)
RBC #/AREA URNS HPF: ABNORMAL /HPF (ref 0–5)
RBC MORPH BLD: ABNORMAL
SODIUM SERPL-SCNC: 137 MMOL/L (ref 136–145)
SOURCE, COVRS: NORMAL
SP GR UR REFRACTOMETRY: 1.01 (ref 1–1.03)
UA: UC IF INDICATED,UAUC: ABNORMAL
UROBILINOGEN UR QL STRIP.AUTO: 1 EU/DL (ref 0.2–1)
WBC # BLD AUTO: 7.8 K/UL (ref 3.6–11)
WBC URNS QL MICRO: ABNORMAL /HPF (ref 0–4)

## 2022-12-27 PROCEDURE — 36415 COLL VENOUS BLD VENIPUNCTURE: CPT

## 2022-12-27 PROCEDURE — 99285 EMERGENCY DEPT VISIT HI MDM: CPT

## 2022-12-27 PROCEDURE — 87635 SARS-COV-2 COVID-19 AMP PRB: CPT

## 2022-12-27 PROCEDURE — 96361 HYDRATE IV INFUSION ADD-ON: CPT

## 2022-12-27 PROCEDURE — 87804 INFLUENZA ASSAY W/OPTIC: CPT

## 2022-12-27 PROCEDURE — 70450 CT HEAD/BRAIN W/O DYE: CPT

## 2022-12-27 PROCEDURE — 96374 THER/PROPH/DIAG INJ IV PUSH: CPT

## 2022-12-27 PROCEDURE — 83690 ASSAY OF LIPASE: CPT

## 2022-12-27 PROCEDURE — 85025 COMPLETE CBC W/AUTO DIFF WBC: CPT

## 2022-12-27 PROCEDURE — 81001 URINALYSIS AUTO W/SCOPE: CPT

## 2022-12-27 PROCEDURE — 80053 COMPREHEN METABOLIC PANEL: CPT

## 2022-12-27 PROCEDURE — 74011250636 HC RX REV CODE- 250/636: Performed by: EMERGENCY MEDICINE

## 2022-12-27 PROCEDURE — 74177 CT ABD & PELVIS W/CONTRAST: CPT

## 2022-12-27 PROCEDURE — 74011000636 HC RX REV CODE- 636: Performed by: EMERGENCY MEDICINE

## 2022-12-27 RX ORDER — ONDANSETRON 4 MG/1
4 TABLET, ORALLY DISINTEGRATING ORAL
Qty: 20 TABLET | Refills: 0 | Status: SHIPPED | OUTPATIENT
Start: 2022-12-27

## 2022-12-27 RX ORDER — ONDANSETRON 2 MG/ML
4 INJECTION INTRAMUSCULAR; INTRAVENOUS ONCE
Status: COMPLETED | OUTPATIENT
Start: 2022-12-27 | End: 2022-12-27

## 2022-12-27 RX ADMIN — IOPAMIDOL 100 ML: 755 INJECTION, SOLUTION INTRAVENOUS at 17:25

## 2022-12-27 RX ADMIN — SODIUM CHLORIDE 500 ML: 9 INJECTION, SOLUTION INTRAVENOUS at 16:49

## 2022-12-27 RX ADMIN — ONDANSETRON 4 MG: 2 INJECTION INTRAMUSCULAR; INTRAVENOUS at 16:48

## 2022-12-27 NOTE — ED PROVIDER NOTES
EMERGENCY DEPARTMENT HISTORY AND PHYSICAL EXAM      Date: 12/27/2022  Patient Name: Pablo Baker    History of Presenting Illness     Chief Complaint   Patient presents with    Vomiting     Patient with vomiting since 11am today. Just started taking mirtazapine for depression 3 days ago. Patient denies nausea in triage, states \"feels fine\". History Provided By: Patient and Patient's Daughter    HPI: Pablo Baker, [de-identified] y.o. female with history of hypertension, diabetes, prior CVA presents to the ED with cc of nausea, vomiting, generalized abdominal pain. Symptoms onset this morning. Patient's daughter brought her in for about 3 episodes of nonbloody nonbilious emesis. Patient complained of generalized abdominal pain. No reports of fevers, chills, recent illness, exposure to sick contacts. Patient endorses generalized abdominal pain which is new for her. No aggravating or relieving factors. Daughter reports that patient is more tired today than usual and not as talkative. Patient is hunched over in wheelchair on my examination, not very talkative and not contributing to history due to her symptoms, all HPI and ROS obtained from patient's daughter at her side. There are no other complaints, changes, or physical findings at this time. PCP: Rebekah Guzman MD    No current facility-administered medications on file prior to encounter. Current Outpatient Medications on File Prior to Encounter   Medication Sig Dispense Refill    cetirizine (ZYRTEC) 10 mg tablet Take 1 Tablet by mouth daily as needed. metoprolol succinate (TOPROL-XL) 100 mg tablet Take 100 mg by mouth daily. naproxen sodium (Aleve) 220 mg tablet Take 2 Tablets by mouth every eight (8) hours as needed for Pain. 20 Tablet 0    lidocaine (Lidoderm) 5 % Apply patch to the affected area for 12 hours a day and remove for 12 hours a day. 1 Each 0    sertraline (ZOLOFT) 25 mg tablet Take 1 Tab by mouth daily.  30 Tab 0 acetaminophen (TYLENOL) 325 mg tablet Take 2 Tabs by mouth every four (4) hours as needed for Pain. 20 Tab 0    multivitamin capsule Take 1 Cap by mouth daily. aspirin (ASPIRIN) 325 mg tablet Take 1 Tab by mouth daily. 30 Tab 0    atorvastatin (LIPITOR) 40 mg tablet Take 1 Tab by mouth nightly. 30 Tab 0    diltiazem CD (CARTIA XT) 240 mg ER capsule Take 240 mg by mouth daily. metFORMIN (GLUCOPHAGE) 500 mg tablet Take 500 mg by mouth two (2) times daily (with meals). losartan (COZAAR) 100 mg tablet Take 100 mg by mouth daily. metoprolol tartrate (LOPRESSOR) 50 mg tablet Take 50 mg by mouth two (2) times a day. ergocalciferol (VITAMIN D2) 50,000 unit capsule Take 50,000 Units by mouth every month. diazepam (VALIUM) 2 mg tablet Take 1 Tab by mouth every eight (8) hours as needed (muscle spasm). Max Daily Amount: 6 mg. 20 Tab 0       Past History     Past Medical History:  Past Medical History:   Diagnosis Date    Diabetes ([facility] [address]     Hypertension        Past Surgical History:  No past surgical history on file. Family History:  No family history on file. Social History:  Social History     Tobacco Use    Smoking status: Passive Smoke Exposure - Never Smoker    Smokeless tobacco: Never   Substance Use Topics    Alcohol use: No    Drug use: No       Allergies:  No Known Allergies      Review of Systems   Review of Systems   Unable to perform ROS: Mental status change     Physical Exam   Physical Exam  Vitals and nursing note reviewed. Constitutional:       General: She is not in acute distress. Appearance: Normal appearance. She is not ill-appearing or toxic-appearing. Comments: Patient appears hunched/doubled over in wheelchair on my exam.   HENT:      Head: Normocephalic and atraumatic. Nose: Nose normal.      Mouth/Throat:      Mouth: Mucous membranes are moist.   Eyes:      Extraocular Movements: Extraocular movements intact.       Pupils: Pupils are equal, round, and reactive to light. Cardiovascular:      Rate and Rhythm: Normal rate and regular rhythm. Heart sounds: No murmur heard. Pulmonary:      Effort: Pulmonary effort is normal. No respiratory distress. Breath sounds: Normal breath sounds. No wheezing. Abdominal:      General: There is no distension. Palpations: Abdomen is soft. There is no mass. Tenderness: There is abdominal tenderness (Generalized). There is no guarding or rebound. Hernia: No hernia is present. Musculoskeletal:         General: No swelling or tenderness. Normal range of motion. Cervical back: Normal range of motion and neck supple. Right lower leg: No edema. Left lower leg: No edema. Skin:     General: Skin is warm and dry. Coloration: Skin is not pale. Findings: No erythema. Neurological:      General: No focal deficit present. Mental Status: She is alert and oriented to person, place, and time. Diagnostic Study Results     Labs -     Recent Results (from the past 12 hour(s))   CBC WITH AUTOMATED DIFF    Collection Time: 12/27/22  2:23 PM   Result Value Ref Range    WBC 7.8 3.6 - 11.0 K/uL    RBC 4.54 3.80 - 5.20 M/uL    HGB 13.7 11.5 - 16.0 g/dL    HCT 43.2 35.0 - 47.0 %    MCV 95.2 80.0 - 99.0 FL    MCH 30.2 26.0 - 34.0 PG    MCHC 31.7 30.0 - 36.5 g/dL    RDW 15.8 (H) 11.5 - 14.5 %    PLATELET 044 071 - 929 K/uL    MPV 10.3 8.9 - 12.9 FL    NRBC 0.0 0  WBC    ABSOLUTE NRBC 0.00 0.00 - 0.01 K/uL    NEUTROPHILS 93 (H) 32 - 75 %    LYMPHOCYTES 5 (L) 12 - 49 %    MONOCYTES 1 (L) 5 - 13 %    EOSINOPHILS 0 0 - 7 %    BASOPHILS 0 0 - 1 %    IMMATURE GRANULOCYTES 1 (H) 0.0 - 0.5 %    ABS. NEUTROPHILS 7.2 1.8 - 8.0 K/UL    ABS. LYMPHOCYTES 0.4 (L) 0.8 - 3.5 K/UL    ABS. MONOCYTES 0.1 0.0 - 1.0 K/UL    ABS. EOSINOPHILS 0.0 0.0 - 0.4 K/UL    ABS. BASOPHILS 0.0 0.0 - 0.1 K/UL    ABS. IMM.  GRANS. 0.1 (H) 0.00 - 0.04 K/UL    DF SMEAR SCANNED      RBC COMMENTS NORMOCYTIC, NORMOCHROMIC     METABOLIC PANEL, COMPREHENSIVE    Collection Time: 12/27/22  2:23 PM   Result Value Ref Range    Sodium 137 136 - 145 mmol/L    Potassium 4.0 3.5 - 5.1 mmol/L    Chloride 107 97 - 108 mmol/L    CO2 22 21 - 32 mmol/L    Anion gap 8 5 - 15 mmol/L    Glucose 228 (H) 65 - 100 mg/dL    BUN 20 6 - 20 MG/DL    Creatinine 0.97 0.55 - 1.02 MG/DL    BUN/Creatinine ratio 21 (H) 12 - 20      eGFR 59 (L) >60 ml/min/1.73m2    Calcium 9.6 8.5 - 10.1 MG/DL    Bilirubin, total 0.5 0.2 - 1.0 MG/DL    ALT (SGPT) 28 12 - 78 U/L    AST (SGOT) 16 15 - 37 U/L    Alk. phosphatase 89 45 - 117 U/L    Protein, total 8.2 6.4 - 8.2 g/dL    Albumin 3.9 3.5 - 5.0 g/dL    Globulin 4.3 (H) 2.0 - 4.0 g/dL    A-G Ratio 0.9 (L) 1.1 - 2.2     LIPASE    Collection Time: 12/27/22  2:23 PM   Result Value Ref Range    Lipase 122 73 - 393 U/L   COVID-19 RAPID TEST    Collection Time: 12/27/22  5:45 PM   Result Value Ref Range    Specimen source Nasopharyngeal      COVID-19 rapid test Not detected NOTD     INFLUENZA A+B VIRAL AGS    Collection Time: 12/27/22  5:45 PM   Result Value Ref Range    Influenza A Antigen Negative NEG      Influenza B Antigen Negative NEG     URINALYSIS W/ REFLEX CULTURE    Collection Time: 12/27/22  6:24 PM    Specimen: Urine   Result Value Ref Range    Color YELLOW/STRAW      Appearance CLEAR CLEAR      Specific gravity 1.010 1.003 - 1.030      pH (UA) 6.5 5.0 - 8.0      Protein 30 (A) NEG mg/dL    Glucose Negative NEG mg/dL    Ketone Negative NEG mg/dL    Bilirubin Negative NEG      Blood SMALL (A) NEG      Urobilinogen 1.0 0.2 - 1.0 EU/dL    Nitrites Negative NEG      Leukocyte Esterase Negative NEG      UA:UC IF INDICATED CULTURE NOT INDICATED BY UA RESULT      WBC 0-4 0 - 4 /hpf    RBC 0-5 0 - 5 /hpf    Epithelial cells FEW FEW /lpf    Bacteria Negative NEG /hpf    Hyaline cast 0-2 0 - 2 /lpf       Radiologic Studies -   CT ABD PELV W CONT   Final Result      1. No bowel obstruction.  Moderate amount of colonic stool.   2. Bilateral nephrolithiasis with no hydronephrosis. 3. Hepatic cysts. CT HEAD WO CONT   Final Result      No acute process. Extensive chronic white matter disease and areas of prior   infarction. If high clinical concern for acute process consider MRI. CT Results  (Last 48 hours)                 12/27/22 1725  CT ABD PELV W CONT Final result    Impression:      1. No bowel obstruction. Moderate amount of colonic stool. 2. Bilateral nephrolithiasis with no hydronephrosis. 3. Hepatic cysts. Narrative:  INDICATION: n/v, abd pain       COMPARISON: None       TECHNIQUE:   Following the uneventful intravenous administration of IV contrast, thin axial   images were obtained through the abdomen and pelvis. Coronal and sagittal   reconstructions were generated. Oral contrast was not administered. CT dose   reduction was achieved through use of a standardized protocol tailored for this   examination and automatic exposure control for dose modulation. FINDINGS:   LUNG BASES: No abnormality. LIVER: Multiple cysts largest of which measures 3.9 cm. Several other   subcentimeter hypodensities are too small to characterize. GALLBLADDER: Unremarkable. SPLEEN: No enlargement or lesion. PANCREAS: No mass or ductal dilatation. Possible pancreas divisum. ADRENALS: 16 mm hyperdense nodule medial left adrenal gland. Minimal fullness   lateral right adrenal gland without discrete nodule. KIDNEYS: Bilateral cystic structures, several of which are too small to   characterize. 10 mm calcification lower pole left kidney. 4 mm calcification mid   right kidney. No hydronephrosis. GI TRACT: No bowel obstruction. Difficult to assess bowel wall thickening given   lack of oral contrast material.   PERITONEUM: No free air or free fluid. APPENDIX: Unremarkable. RETROPERITONEUM: No aortic aneurysm. LYMPH NODES: None enlarged.    ADDITIONAL COMMENTS: Fat density 8.9 x 3.3 cm mass right lateral abdominal wall   consistent with lipoma. Similar but smaller 3 cm lipoma in the left lower   quadrant abdominal wall       URINARY BLADDER: Unremarkable. REPRODUCTIVE ORGANS: Uterus is present with a 2 cm right body fibroid. LYMPH NODES: None enlarged. FREE FLUID: None. BONES: No destructive bone lesion. ADDITIONAL COMMENTS: N/A.           12/27/22 1725  CT HEAD WO CONT Final result    Impression:      No acute process. Extensive chronic white matter disease and areas of prior   infarction. If high clinical concern for acute process consider MRI. Narrative:  INDICATION: AMS, n/v       EXAM:  HEAD CT WITHOUT CONTRAST       COMPARISON: 11/24/2020       TECHNIQUE:  Routine noncontrast axial head CT was performed. Sagittal and   coronal reconstructions were generated. CT dose reduction was achieved through use of a standardized protocol tailored   for this examination and automatic exposure control for dose modulation. FINDINGS:       Ventricles: Midline, no hydrocephalus. Intracranial Hemorrhage: None. Brain Parenchyma/Brainstem: Extensive chronic white matter hypodensities and   areas of chronic infarction bilateral thalami and corona radiata similar to   prior study. Basal Cisterns: Normal.   Paranasal Sinuses: Visualized sinuses are clear. Additional Comments: N/A. CXR Results  (Last 48 hours)      None            Medical Decision Making   I am the first provider for this patient. I reviewed the vital signs, available nursing notes, past medical history, past surgical history, family history and social history. Vital Signs-Reviewed the patient's vital signs.   Patient Vitals for the past 12 hrs:   Temp Pulse Resp BP SpO2   12/27/22 1417 97.4 °F (36.3 °C) 81 17 (!) 146/100 99 %       Records Reviewed: Nursing Notes    Provider Notes (Medical Decision Making):   82-year-old female presenting to the emergency department nausea, vomiting, generalized abdominal pain onset this morning. Afebrile and vital signs are stable. Exam as above with generalized reproducible TTP without guarding, rebound, peritoneal findings. Differential diagnosis broad and includes viral syndrome including COVID-19 or influenza infection, viral gastroenteritis, gastritis, colitis, acute appendicitis, cholecystitis, UTI, pyelonephritis, renal colic, AAA. I will evaluate with CT imaging head, abdomen pelvis, blood work, urinalysis, treat symptomatically with IV Zofran and IV fluids and reassess. ED Course as of 12/27/22 2036   Tue Dec 27, 2022   2036 On reassessment patient is feeling better, able to tolerate p.o.  CT negative for acute process. Blood work and urinalysis unremarkable as are viral swabs. Plan to discharge home with prescription for Zofran. Encourage close follow-up with PCP and given strict return ED precautions. [AK]      ED Course User Index  [AK] Samanta Franco MD       ED Course:   Initial assessment performed. The patients presenting problems have been discussed, and they are in agreement with the care plan formulated and outlined with them. I have encouraged them to ask questions as they arise throughout their visit. Discharge Note:  The patient has been re-evaluated and is ready for discharge. Reviewed available results with patient. Counseled patient on diagnosis and care plan. Patient has expressed understanding, and all questions have been answered. Patient agrees with plan and agrees to follow up as recommended, or to return to the ED if their symptoms worsen. Discharge instructions have been provided and explained to the patient, along with reasons to return to the ED. Disposition:  discharge      DISCHARGE PLAN:  1. Current Discharge Medication List        START taking these medications    Details   ondansetron (ZOFRAN ODT) 4 mg disintegrating tablet Take 1 Tablet by mouth every eight (8) hours as needed for Nausea or Vomiting.   Qty: 20 Tablet, Refills: 0  Start date: 12/27/2022           2. Follow-up Information       Follow up With Specialties Details Why Contact Info    Enzo England MD Internal Medicine Physician Schedule an appointment as soon as possible for a visit   [address] [address]   [phone]      [facility] EMERGENCY DEPT Emergency Medicine Go to  As needed, If symptoms worsen [address]  [address] [facility]  [phone]          3. Return to ED if worse     Diagnosis     Clinical Impression:   1. Abdominal pain, generalized    2. Nausea and vomiting, unspecified vomiting type        Attestations:  I am the first and primary provider of record for this patient's ED encounter. I personally performed the services described above in this documentation. Elsie Melton MD    Please note that this dictation was completed with Zola, the [employer] voice recognition software. Quite often unanticipated grammatical, syntax, homophones, and other interpretive errors are inadvertently transcribed by the computer software. Please disregard these errors. Please excuse any errors that have escaped final proofreading. Thank you.

## 2022-12-28 NOTE — DISCHARGE INSTRUCTIONS
You were evaluated in the emergency department for nausea, vomiting, and abdominal pain. Your examination was reassuring as was your work-up including blood work, urinalysis, and CT scan. It will be important for you to follow-up with your primary care physician in 2-3 days. If you develop worsening symptoms such as worsening abdominal pain, nausea, vomiting, fevers, inability eat or drink, please return to the emergency department immediately.

## 2024-06-19 ENCOUNTER — APPOINTMENT (OUTPATIENT)
Facility: HOSPITAL | Age: 84
End: 2024-06-19
Payer: MEDICARE

## 2024-06-19 ENCOUNTER — HOSPITAL ENCOUNTER (EMERGENCY)
Facility: HOSPITAL | Age: 84
Discharge: HOME | End: 2024-06-19
Attending: EMERGENCY MEDICINE
Payer: MEDICARE

## 2024-06-19 VITALS
TEMPERATURE: 97.5 F | WEIGHT: 120 LBS | RESPIRATION RATE: 20 BRPM | DIASTOLIC BLOOD PRESSURE: 76 MMHG | HEIGHT: 61 IN | BODY MASS INDEX: 22.66 KG/M2 | OXYGEN SATURATION: 98 % | SYSTOLIC BLOOD PRESSURE: 136 MMHG | HEART RATE: 61 BPM

## 2024-06-19 DIAGNOSIS — J06.9 ACUTE UPPER RESPIRATORY INFECTION: Primary | ICD-10-CM

## 2024-06-19 LAB
ALBUMIN SERPL-MCNC: 3.5 G/DL (ref 3.5–5)
ALBUMIN/GLOB SERPL: 0.7 (ref 1.1–2.2)
ALP SERPL-CCNC: 88 U/L (ref 45–117)
ALT SERPL-CCNC: 22 U/L (ref 12–78)
ANION GAP SERPL CALC-SCNC: 4 MMOL/L (ref 5–15)
AST SERPL-CCNC: 16 U/L (ref 15–37)
BASOPHILS # BLD: 0 K/UL (ref 0–0.1)
BASOPHILS NFR BLD: 1 % (ref 0–1)
BILIRUB SERPL-MCNC: 0.5 MG/DL (ref 0.2–1)
BUN SERPL-MCNC: 20 MG/DL (ref 6–20)
BUN/CREAT SERPL: 19 (ref 12–20)
CALCIUM SERPL-MCNC: 9.9 MG/DL (ref 8.5–10.1)
CHLORIDE SERPL-SCNC: 110 MMOL/L (ref 97–108)
CO2 SERPL-SCNC: 25 MMOL/L (ref 21–32)
CREAT SERPL-MCNC: 1.03 MG/DL (ref 0.55–1.02)
DIFFERENTIAL METHOD BLD: ABNORMAL
EKG ATRIAL RATE: 65 BPM
EKG DIAGNOSIS: NORMAL
EKG P AXIS: 59 DEGREES
EKG P-R INTERVAL: 144 MS
EKG Q-T INTERVAL: 424 MS
EKG QRS DURATION: 80 MS
EKG QTC CALCULATION (BAZETT): 440 MS
EKG R AXIS: -27 DEGREES
EKG T AXIS: 16 DEGREES
EKG VENTRICULAR RATE: 65 BPM
EOSINOPHIL # BLD: 0.1 K/UL (ref 0–0.4)
EOSINOPHIL NFR BLD: 1 % (ref 0–7)
ERYTHROCYTE [DISTWIDTH] IN BLOOD BY AUTOMATED COUNT: 16.1 % (ref 11.5–14.5)
GLOBULIN SER CALC-MCNC: 4.8 G/DL (ref 2–4)
GLUCOSE SERPL-MCNC: 188 MG/DL (ref 65–100)
HCT VFR BLD AUTO: 41.2 % (ref 35–47)
HGB BLD-MCNC: 12.7 G/DL (ref 11.5–16)
IMM GRANULOCYTES # BLD AUTO: 0.1 K/UL (ref 0–0.04)
IMM GRANULOCYTES NFR BLD AUTO: 1 % (ref 0–0.5)
LYMPHOCYTES # BLD: 1 K/UL (ref 0.8–3.5)
LYMPHOCYTES NFR BLD: 13 % (ref 12–49)
MAGNESIUM SERPL-MCNC: 2 MG/DL (ref 1.6–2.4)
MCH RBC QN AUTO: 29.5 PG (ref 26–34)
MCHC RBC AUTO-ENTMCNC: 30.8 G/DL (ref 30–36.5)
MCV RBC AUTO: 95.8 FL (ref 80–99)
MONOCYTES # BLD: 0.5 K/UL (ref 0–1)
MONOCYTES NFR BLD: 6 % (ref 5–13)
NEUTS SEG # BLD: 6.1 K/UL (ref 1.8–8)
NEUTS SEG NFR BLD: 78 % (ref 32–75)
NRBC # BLD: 0 K/UL (ref 0–0.01)
NRBC BLD-RTO: 0 PER 100 WBC
PLATELET # BLD AUTO: 255 K/UL (ref 150–400)
PMV BLD AUTO: 10.3 FL (ref 8.9–12.9)
POTASSIUM SERPL-SCNC: 4.4 MMOL/L (ref 3.5–5.1)
PROT SERPL-MCNC: 8.3 G/DL (ref 6.4–8.2)
RBC # BLD AUTO: 4.3 M/UL (ref 3.8–5.2)
SARS-COV-2 RNA RESP QL NAA+PROBE: NOT DETECTED
SODIUM SERPL-SCNC: 139 MMOL/L (ref 136–145)
SOURCE: NORMAL
TROPONIN I SERPL HS-MCNC: 8 NG/L (ref 0–51)
WBC # BLD AUTO: 7.7 K/UL (ref 3.6–11)

## 2024-06-19 PROCEDURE — 87635 SARS-COV-2 COVID-19 AMP PRB: CPT

## 2024-06-19 PROCEDURE — 2500000003 HC RX 250 WO HCPCS: Performed by: EMERGENCY MEDICINE

## 2024-06-19 PROCEDURE — 83735 ASSAY OF MAGNESIUM: CPT

## 2024-06-19 PROCEDURE — 2580000003 HC RX 258: Performed by: EMERGENCY MEDICINE

## 2024-06-19 PROCEDURE — 80053 COMPREHEN METABOLIC PANEL: CPT

## 2024-06-19 PROCEDURE — 99285 EMERGENCY DEPT VISIT HI MDM: CPT

## 2024-06-19 PROCEDURE — 96374 THER/PROPH/DIAG INJ IV PUSH: CPT

## 2024-06-19 PROCEDURE — 84484 ASSAY OF TROPONIN QUANT: CPT

## 2024-06-19 PROCEDURE — 85025 COMPLETE CBC W/AUTO DIFF WBC: CPT

## 2024-06-19 PROCEDURE — 36415 COLL VENOUS BLD VENIPUNCTURE: CPT

## 2024-06-19 PROCEDURE — 71045 X-RAY EXAM CHEST 1 VIEW: CPT

## 2024-06-19 PROCEDURE — 6360000002 HC RX W HCPCS: Performed by: EMERGENCY MEDICINE

## 2024-06-19 RX ORDER — GUAIFENESIN 600 MG/1
600 TABLET, EXTENDED RELEASE ORAL
Status: DISCONTINUED | OUTPATIENT
Start: 2024-06-19 | End: 2024-06-19

## 2024-06-19 RX ORDER — GUAIFENESIN 200 MG/10ML
200 LIQUID ORAL
Status: COMPLETED | OUTPATIENT
Start: 2024-06-19 | End: 2024-06-19

## 2024-06-19 RX ORDER — PREDNISOLONE SODIUM PHOSPHATE 15 MG/5ML
30 SOLUTION ORAL DAILY
Qty: 50 ML | Refills: 0 | Status: SHIPPED | OUTPATIENT
Start: 2024-06-19 | End: 2024-06-24

## 2024-06-19 RX ADMIN — GUAIFENESIN 200 MG: 200 SOLUTION ORAL at 07:17

## 2024-06-19 RX ADMIN — METHYLPREDNISOLONE SODIUM SUCCINATE 40 MG: 40 INJECTION INTRAMUSCULAR; INTRAVENOUS at 07:56

## 2024-06-19 ASSESSMENT — PAIN - FUNCTIONAL ASSESSMENT: PAIN_FUNCTIONAL_ASSESSMENT: NONE - DENIES PAIN

## 2024-06-19 ASSESSMENT — LIFESTYLE VARIABLES
HOW OFTEN DO YOU HAVE A DRINK CONTAINING ALCOHOL: NEVER
HOW MANY STANDARD DRINKS CONTAINING ALCOHOL DO YOU HAVE ON A TYPICAL DAY: PATIENT DOES NOT DRINK

## 2024-06-19 NOTE — ED PROVIDER NOTES
LOSARTAN (COZAAR) 100 MG TABLET    Take by mouth daily    METFORMIN (GLUCOPHAGE) 500 MG TABLET    Take by mouth 2 times daily (with meals)    METOPROLOL SUCCINATE (TOPROL XL) 100 MG EXTENDED RELEASE TABLET    Take by mouth daily    METOPROLOL TARTRATE (LOPRESSOR) 50 MG TABLET    Take by mouth 2 times daily    NAPROXEN SODIUM (ANAPROX) 220 MG TABLET    Take by mouth every 8 hours as needed    ONDANSETRON (ZOFRAN-ODT) 4 MG DISINTEGRATING TABLET    Take by mouth every 8 hours as needed    SERTRALINE (ZOLOFT) 25 MG TABLET    Take by mouth daily       SCREENINGS               No data recorded         PHYSICAL EXAM      ED Triage Vitals [06/19/24 0630]   Enc Vitals Group      BP (!) 153/77      Pulse 73      Respirations 16      Temp 97.5 °F (36.4 °C)      Temp Source Oral      SpO2 100 %      Weight - Scale 54.4 kg (120 lb)      Height 1.549 m (5' 1\")      Head Circumference       Peak Flow       Pain Score       Pain Loc       Pain Edu?       Excl. in GC?         Physical Exam  Vitals and nursing note reviewed.   Constitutional:       General: She is not in acute distress.     Appearance: Normal appearance. She is not ill-appearing.   HENT:      Head: Normocephalic and atraumatic.      Mouth/Throat:      Mouth: Mucous membranes are moist.   Eyes:      General: No scleral icterus.     Extraocular Movements: Extraocular movements intact.      Pupils: Pupils are equal, round, and reactive to light.   Cardiovascular:      Rate and Rhythm: Normal rate and regular rhythm.   Pulmonary:      Effort: Pulmonary effort is normal. No respiratory distress.      Breath sounds: Normal breath sounds. No stridor. No wheezing, rhonchi or rales.   Chest:      Chest wall: No tenderness.   Abdominal:      General: There is no distension.      Palpations: Abdomen is soft.      Tenderness: There is no abdominal tenderness.   Musculoskeletal:      Cervical back: Normal range of motion and neck supple.      Right lower leg: No edema.      Left

## 2024-06-19 NOTE — DISCHARGE INSTRUCTIONS
First dose of steroids started today.  Prescription has been sent through for Orapred for 5 days starting tomorrow.    Can consider over-the-counter Mucinex or Robitussin for cough.

## 2024-06-19 NOTE — ED NOTES
Bedside and Verbal shift change report given to Saran (oncoming nurse) by Sabrina (offgoing nurse). Report included the following information ED SBAR.

## 2024-07-12 LAB
EKG ATRIAL RATE: 65 BPM
EKG DIAGNOSIS: NORMAL
EKG P AXIS: 59 DEGREES
EKG P-R INTERVAL: 144 MS
EKG Q-T INTERVAL: 424 MS
EKG QRS DURATION: 80 MS
EKG QTC CALCULATION (BAZETT): 440 MS
EKG R AXIS: -27 DEGREES
EKG T AXIS: 16 DEGREES
EKG VENTRICULAR RATE: 65 BPM